# Patient Record
Sex: FEMALE | Race: WHITE | NOT HISPANIC OR LATINO | Employment: FULL TIME | URBAN - METROPOLITAN AREA
[De-identification: names, ages, dates, MRNs, and addresses within clinical notes are randomized per-mention and may not be internally consistent; named-entity substitution may affect disease eponyms.]

---
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
## 2023-07-05 ENCOUNTER — OFFICE VISIT (OUTPATIENT)
Dept: URGENT CARE | Facility: CLINIC | Age: 18
End: 2023-07-05
Payer: COMMERCIAL

## 2023-07-05 VITALS
TEMPERATURE: 99 F | OXYGEN SATURATION: 95 % | HEIGHT: 67 IN | DIASTOLIC BLOOD PRESSURE: 68 MMHG | SYSTOLIC BLOOD PRESSURE: 112 MMHG | HEART RATE: 84 BPM | BODY MASS INDEX: 34.53 KG/M2 | WEIGHT: 220 LBS | RESPIRATION RATE: 20 BRPM

## 2023-07-05 DIAGNOSIS — N89.8 VAGINAL ITCHING: ICD-10-CM

## 2023-07-05 DIAGNOSIS — N89.8 VAGINAL DISCHARGE: ICD-10-CM

## 2023-07-05 DIAGNOSIS — R31.9 HEMATURIA, UNSPECIFIED TYPE: ICD-10-CM

## 2023-07-05 DIAGNOSIS — N89.8 VAGINAL ODOR: ICD-10-CM

## 2023-07-05 DIAGNOSIS — R39.15 URINARY URGENCY: ICD-10-CM

## 2023-07-05 DIAGNOSIS — R30.0 DYSURIA: Primary | ICD-10-CM

## 2023-07-05 DIAGNOSIS — R35.0 URINARY FREQUENCY: ICD-10-CM

## 2023-07-05 DIAGNOSIS — Z11.3 SCREENING FOR STDS (SEXUALLY TRANSMITTED DISEASES): ICD-10-CM

## 2023-07-05 LAB
B-HCG UR QL: NEGATIVE
BILIRUB UR QL STRIP: NEGATIVE
CTP QC/QA: YES
GLUCOSE UR QL STRIP: NEGATIVE
HCV AB SERPL QL IA: NORMAL
HIV 1+2 AB+HIV1 P24 AG SERPL QL IA: NORMAL
KETONES UR QL STRIP: NEGATIVE
LEUKOCYTE ESTERASE UR QL STRIP: NEGATIVE
PH, POC UA: 5 (ref 5–8)
POC BLOOD, URINE: POSITIVE
POC NITRATES, URINE: NEGATIVE
PROT UR QL STRIP: NEGATIVE
SP GR UR STRIP: 1.02 (ref 1–1.03)
UROBILINOGEN UR STRIP-ACNC: ABNORMAL (ref 0.1–1.1)

## 2023-07-05 PROCEDURE — 86592 SYPHILIS TEST NON-TREP QUAL: CPT | Performed by: NURSE PRACTITIONER

## 2023-07-05 PROCEDURE — 81003 URINALYSIS AUTO W/O SCOPE: CPT | Mod: QW,S$GLB,, | Performed by: NURSE PRACTITIONER

## 2023-07-05 PROCEDURE — 86803 HEPATITIS C AB TEST: CPT | Performed by: NURSE PRACTITIONER

## 2023-07-05 PROCEDURE — 87591 N.GONORRHOEAE DNA AMP PROB: CPT | Performed by: NURSE PRACTITIONER

## 2023-07-05 PROCEDURE — 81025 URINE PREGNANCY TEST: CPT | Mod: S$GLB,,, | Performed by: NURSE PRACTITIONER

## 2023-07-05 PROCEDURE — 99203 PR OFFICE/OUTPT VISIT, NEW, LEVL III, 30-44 MIN: ICD-10-PCS | Mod: S$GLB,,, | Performed by: NURSE PRACTITIONER

## 2023-07-05 PROCEDURE — 81003 POCT URINALYSIS, DIPSTICK, AUTOMATED, W/O SCOPE: ICD-10-PCS | Mod: QW,S$GLB,, | Performed by: NURSE PRACTITIONER

## 2023-07-05 PROCEDURE — 99203 OFFICE O/P NEW LOW 30 MIN: CPT | Mod: S$GLB,,, | Performed by: NURSE PRACTITIONER

## 2023-07-05 PROCEDURE — 87389 HIV-1 AG W/HIV-1&-2 AB AG IA: CPT | Performed by: NURSE PRACTITIONER

## 2023-07-05 PROCEDURE — 81025 POCT URINE PREGNANCY: ICD-10-PCS | Mod: S$GLB,,, | Performed by: NURSE PRACTITIONER

## 2023-07-05 PROCEDURE — 81514 NFCT DS BV&VAGINITIS DNA ALG: CPT | Performed by: NURSE PRACTITIONER

## 2023-07-05 RX ORDER — NITROFURANTOIN 25; 75 MG/1; MG/1
100 CAPSULE ORAL 2 TIMES DAILY
Qty: 10 CAPSULE | Refills: 0 | Status: SHIPPED | OUTPATIENT
Start: 2023-07-05 | End: 2023-07-10

## 2023-07-05 RX ORDER — FLUCONAZOLE 150 MG/1
TABLET ORAL
Qty: 2 TABLET | Refills: 0 | Status: SHIPPED | OUTPATIENT
Start: 2023-07-05

## 2023-07-05 RX ORDER — FLUCONAZOLE 150 MG/1
150 TABLET ORAL DAILY
Qty: 2 TABLET | Refills: 0 | Status: SHIPPED | OUTPATIENT
Start: 2023-07-05 | End: 2023-07-05

## 2023-07-05 NOTE — PROGRESS NOTES
"Subjective:      Patient ID: Zoie Montes is a 18 y.o. female.    Vitals:  height is 5' 7" (1.702 m) and weight is 99.8 kg (220 lb). Her oral temperature is 98.9 °F (37.2 °C). Her blood pressure is 112/68 and her pulse is 84. Her respiration is 20 and oxygen saturation is 95%.     Chief Complaint: Urinary Tract Infection (Yeast infection, STI screening - Entered by patient)    Patient present with pain and burning while urinating withholding urine and vaginal itching, with discharge with odor to her urine.     18 year old female presents to clinic with complaints of vaginal itching with thick vaginal discharge with a history of frequent yeast infections, vaginal odor, urinary frequency, urinary urgency. Positive for sexual activity without condom use or partner complaint.     Urinary Tract Infection   This is a new problem. The current episode started in the past 7 days. The problem occurs every urination. The problem has been gradually worsening. The quality of the pain is described as aching and burning. The pain is at a severity of 4/10. The pain is mild. She is Sexually active. Associated symptoms include a discharge, frequency, hesitancy, a possible pregnancy, urgency and withholding. Pertinent negatives include no behavior changes, chills, flank pain, hematuria, nausea, sweats, vomiting, weight loss, bubble bath use, constipation or rash. She has tried nothing for the symptoms. The treatment provided no relief.     Constitution: Negative for chills and fever.   Gastrointestinal:  Positive for abdominal pain. Negative for nausea, vomiting and constipation.   Genitourinary:  Positive for dysuria, frequency, urgency, vaginal discharge and vaginal odor. Negative for flank pain, hematuria, vaginal pain, vaginal bleeding, painful ejaculation, genital sore, painful ejaculation and pelvic pain.   Skin:  Negative for rash.    Objective:     Physical Exam   Constitutional: She is oriented to person, place, and " time. She appears well-developed.   HENT:   Head: Normocephalic and atraumatic.   Ears:   Right Ear: External ear normal.   Left Ear: External ear normal.   Nose: Nose normal. No nasal deformity. No epistaxis.   Mouth/Throat: Oropharynx is clear and moist and mucous membranes are normal.   Eyes: Lids are normal.   Neck: Phonation normal. Neck supple.   Cardiovascular: Normal rate.   Pulmonary/Chest: Effort normal.   Abdominal: Normal appearance and bowel sounds are normal. She exhibits no distension. Soft. There is abdominal tenderness in the suprapubic area. There is no left CVA tenderness and no right CVA tenderness.   Neurological: She is alert and oriented to person, place, and time.   Skin: Skin is warm, dry and intact.   Psychiatric: Her speech is normal and behavior is normal.   Nursing note and vitals reviewed.    Assessment:     1. Dysuria    2. Vaginal itching    3. Vaginal odor    4. Vaginal discharge    5. Hematuria, unspecified type    6. Urinary urgency    7. Urinary frequency    8. Screening for STDs (sexually transmitted diseases)      Results for orders placed or performed in visit on 07/05/23   POCT Urinalysis, Dipstick, Automated, W/O Scope   Result Value Ref Range    POC Blood, Urine Positive (A) Negative    POC Bilirubin, Urine Negative Negative    POC Urobilinogen, Urine norm 0.1 - 1.1    POC Ketones, Urine Negative Negative    POC Protein, Urine Negative Negative    POC Nitrates, Urine Negative Negative    POC Glucose, Urine Negative Negative    pH, UA 5.0 (A) 5 - 8    POC Specific Gravity, Urine 1.025 1.003 - 1.029    POC Leukocytes, Urine Negative Negative      Plan:       Dysuria  -     POCT Urinalysis, Dipstick, Automated, W/O Scope  -     POCT urine pregnancy  -     nitrofurantoin, macrocrystal-monohydrate, (MACROBID) 100 MG capsule; Take 1 capsule (100 mg total) by mouth 2 (two) times daily. for 5 days  Dispense: 10 capsule; Refill: 0    Vaginal itching    Vaginal odor  -     VAGINOSIS  SCREEN BY DNA PROBE  -     fluconazole (DIFLUCAN) 150 MG Tab; Take 1 tablet (150 mg total) by mouth once daily. May repeat dose x 1 three days after first dose for 2 days  Dispense: 2 tablet; Refill: 0    Vaginal discharge  -     C. trachomatis/N. gonorrhoeae by AMP DNA [facility]; Vagina  -     VAGINOSIS SCREEN BY DNA PROBE    Hematuria, unspecified type    Urinary urgency  -     nitrofurantoin, macrocrystal-monohydrate, (MACROBID) 100 MG capsule; Take 1 capsule (100 mg total) by mouth 2 (two) times daily. for 5 days  Dispense: 10 capsule; Refill: 0    Urinary frequency  -     nitrofurantoin, macrocrystal-monohydrate, (MACROBID) 100 MG capsule; Take 1 capsule (100 mg total) by mouth 2 (two) times daily. for 5 days  Dispense: 10 capsule; Refill: 0    Screening for STDs (sexually transmitted diseases)  -     HIV 1/2 Ag/Ab (4th Gen)  -     HEPATITIS C ANTIBODY  -     RPR  -     C. trachomatis/N. gonorrhoeae by AMP DNA [facility]; Vagina        Patient Instructions   STD testing  You were tested for STDs on today:    As far the STD testing, we may hold off on any medications till the labs result. We will phone in medication for you if needed.  If we have decided to treat you now be sure to take all the meds as directed.  If you need more meds when the labs result we will call you and phone them in for you.  If you do test positive for STDs you should be retested in about 6 weeks again in 6 monts to ensure true negative results.    Increase condom use to prevent further occurance.  Notify sexual partners of the need for testing.  Complete ALL medication prescribed.  NO sexual intercourse for 7 days after treatment.      Today's testing will give no crediable information if you have unprotected sexual activities going forward.      Urinalysis reviewed with patient, questions encouraged and answered    If you were prescribed antibiotics, please take them to completion.    Patient does not report antibiotic induced yeast  infection.    Please drink plenty of fluids.    Please get plenty of rest.    Please follow up with your primary care doctor or specialist as needed.    Please return here or go to the Emergency Department for any concerns or worsening of condition.

## 2023-07-05 NOTE — PATIENT INSTRUCTIONS
STD testing  You were tested for STDs on today:    As far the STD testing, we may hold off on any medications till the labs result. We will phone in medication for you if needed.  If we have decided to treat you now be sure to take all the meds as directed.  If you need more meds when the labs result we will call you and phone them in for you.  If you do test positive for STDs you should be retested in about 6 weeks again in 6 monts to ensure true negative results.    Increase condom use to prevent further occurance.  Notify sexual partners of the need for testing.  Complete ALL medication prescribed.  NO sexual intercourse for 7 days after treatment.      Today's testing will give no crediable information if you have unprotected sexual activities going forward.      Urinalysis reviewed with patient, questions encouraged and answered    If you were prescribed antibiotics, please take them to completion.    Patient does not report antibiotic induced yeast infection.    Please drink plenty of fluids.    Please get plenty of rest.    Please follow up with your primary care doctor or specialist as needed.    Please return here or go to the Emergency Department for any concerns or worsening of condition.

## 2023-07-06 ENCOUNTER — TELEPHONE (OUTPATIENT)
Dept: URGENT CARE | Facility: CLINIC | Age: 18
End: 2023-07-06
Payer: COMMERCIAL

## 2023-07-06 DIAGNOSIS — B00.9 HERPES SIMPLEX TYPE 2 INFECTION: ICD-10-CM

## 2023-07-06 DIAGNOSIS — B96.89 BACTERIAL VAGINOSIS: Primary | ICD-10-CM

## 2023-07-06 DIAGNOSIS — N76.0 BACTERIAL VAGINOSIS: Primary | ICD-10-CM

## 2023-07-06 LAB
BACTERIAL VAGINOSIS DNA: POSITIVE
C TRACH DNA SPEC QL NAA+PROBE: NOT DETECTED
CANDIDA GLABRATA DNA: NEGATIVE
CANDIDA KRUSEI DNA: NEGATIVE
CANDIDA RRNA VAG QL PROBE: POSITIVE
N GONORRHOEA DNA SPEC QL NAA+PROBE: NOT DETECTED
RPR SER QL: NORMAL
T VAGINALIS RRNA GENITAL QL PROBE: NEGATIVE

## 2023-07-06 RX ORDER — VALACYCLOVIR HYDROCHLORIDE 500 MG/1
1000 TABLET, FILM COATED ORAL DAILY
Qty: 60 TABLET | Refills: 0 | Status: SHIPPED | OUTPATIENT
Start: 2023-07-06 | End: 2023-08-05

## 2023-07-06 RX ORDER — METRONIDAZOLE 500 MG/1
500 TABLET ORAL EVERY 12 HOURS
Qty: 14 TABLET | Refills: 0 | Status: SHIPPED | OUTPATIENT
Start: 2023-07-06 | End: 2023-07-13

## 2023-07-06 NOTE — TELEPHONE ENCOUNTER
Results for orders placed or performed in visit on 07/05/23   C. trachomatis/N. gonorrhoeae by AMP DNA [facility]; Vagina    Specimen: Genital   Result Value Ref Range    Chlamydia, Amplified DNA Not Detected Not Detected    N gonorrhoeae, amplified DNA Not Detected Not Detected   VAGINOSIS SCREEN BY DNA PROBE    Specimen: Vagina; Genital   Result Value Ref Range    Trichomonas vaginalis Negative Negative    Candida sp Positive (A) Negative    Candida glabrata DNA Negative Negative    Candida krusei DNA Negative Negative    Bacterial vaginosis DNA Positive (A) Negative   HIV 1/2 Ag/Ab (4th Gen)   Result Value Ref Range    HIV 1/2 Ag/Ab Non-reactive Non-reactive   HEPATITIS C ANTIBODY   Result Value Ref Range    Hepatitis C Ab Non-reactive Non-reactive   RPR   Result Value Ref Range    RPR Non-reactive Non-reactive   POCT Urinalysis, Dipstick, Automated, W/O Scope   Result Value Ref Range    POC Blood, Urine Positive (A) Negative    POC Bilirubin, Urine Negative Negative    POC Urobilinogen, Urine norm 0.1 - 1.1    POC Ketones, Urine Negative Negative    POC Protein, Urine Negative Negative    POC Nitrates, Urine Negative Negative    POC Glucose, Urine Negative Negative    pH, UA 5.0 (A) 5 - 8    POC Specific Gravity, Urine 1.025 1.003 - 1.029    POC Leukocytes, Urine Negative Negative   POCT urine pregnancy   Result Value Ref Range    POC Preg Test, Ur Negative Negative     Acceptable Yes         Discussed lab results with the patient.  Positive for Candida and bacterial vaginosis.  All other labs, unremarkable.  Patient recently treated for Candida with Diflucan, she reports symptoms have greatly improved.  Discussed treatment for bacterial vaginosis with Flagyl twice a day for 7 days, patient verbalized understanding.  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  Advised on safe sexual practices, condom use; patient verbalizes understanding.  Advised to follow-up with her  PCP, our gyn if symptoms should return and or return to urgent care for further evaluation.

## 2023-08-24 ENCOUNTER — OFFICE VISIT (OUTPATIENT)
Dept: URGENT CARE | Facility: CLINIC | Age: 18
End: 2023-08-24
Payer: COMMERCIAL

## 2023-08-24 VITALS
SYSTOLIC BLOOD PRESSURE: 104 MMHG | BODY MASS INDEX: 34.53 KG/M2 | HEART RATE: 87 BPM | DIASTOLIC BLOOD PRESSURE: 77 MMHG | HEIGHT: 67 IN | RESPIRATION RATE: 18 BRPM | TEMPERATURE: 98 F | OXYGEN SATURATION: 97 % | WEIGHT: 220 LBS

## 2023-08-24 DIAGNOSIS — R53.83 FATIGUE, UNSPECIFIED TYPE: ICD-10-CM

## 2023-08-24 DIAGNOSIS — J40 BRONCHITIS: ICD-10-CM

## 2023-08-24 DIAGNOSIS — R05.9 COUGH, UNSPECIFIED TYPE: ICD-10-CM

## 2023-08-24 DIAGNOSIS — B34.9 VIRAL SYNDROME: Primary | ICD-10-CM

## 2023-08-24 DIAGNOSIS — R06.2 WHEEZING: ICD-10-CM

## 2023-08-24 DIAGNOSIS — R51.9 ACUTE NONINTRACTABLE HEADACHE, UNSPECIFIED HEADACHE TYPE: ICD-10-CM

## 2023-08-24 DIAGNOSIS — Z72.0 CURRENT EVERY DAY NICOTINE VAPING: ICD-10-CM

## 2023-08-24 DIAGNOSIS — J02.9 SORE THROAT: ICD-10-CM

## 2023-08-24 DIAGNOSIS — R09.81 NASAL CONGESTION: ICD-10-CM

## 2023-08-24 LAB
CTP QC/QA: YES
SARS-COV-2 AG RESP QL IA.RAPID: NEGATIVE

## 2023-08-24 PROCEDURE — 96372 THER/PROPH/DIAG INJ SC/IM: CPT | Mod: S$GLB,,, | Performed by: NURSE PRACTITIONER

## 2023-08-24 PROCEDURE — 87811 SARS-COV-2 COVID19 W/OPTIC: CPT | Mod: QW,S$GLB,, | Performed by: NURSE PRACTITIONER

## 2023-08-24 PROCEDURE — 96372 PR INJECTION,THERAP/PROPH/DIAG2ST, IM OR SUBCUT: ICD-10-PCS | Mod: S$GLB,,, | Performed by: NURSE PRACTITIONER

## 2023-08-24 PROCEDURE — 99213 PR OFFICE/OUTPT VISIT, EST, LEVL III, 20-29 MIN: ICD-10-PCS | Mod: 25,S$GLB,, | Performed by: NURSE PRACTITIONER

## 2023-08-24 PROCEDURE — 87811 SARS CORONAVIRUS 2 ANTIGEN POCT, MANUAL READ: ICD-10-PCS | Mod: QW,S$GLB,, | Performed by: NURSE PRACTITIONER

## 2023-08-24 PROCEDURE — 99213 OFFICE O/P EST LOW 20 MIN: CPT | Mod: 25,S$GLB,, | Performed by: NURSE PRACTITIONER

## 2023-08-24 RX ORDER — FLUTICASONE PROPIONATE 50 MCG
2 SPRAY, SUSPENSION (ML) NASAL DAILY PRN
Qty: 15.8 ML | Refills: 0 | Status: SHIPPED | OUTPATIENT
Start: 2023-08-24

## 2023-08-24 RX ORDER — ALBUTEROL SULFATE 90 UG/1
2 AEROSOL, METERED RESPIRATORY (INHALATION) EVERY 6 HOURS PRN
Qty: 18 G | Refills: 0 | Status: SHIPPED | OUTPATIENT
Start: 2023-08-24 | End: 2024-08-23

## 2023-08-24 RX ORDER — BENZONATATE 100 MG/1
100 CAPSULE ORAL 3 TIMES DAILY PRN
Qty: 30 CAPSULE | Refills: 0 | Status: SHIPPED | OUTPATIENT
Start: 2023-08-24 | End: 2023-09-03

## 2023-08-24 RX ORDER — DEXAMETHASONE SODIUM PHOSPHATE 100 MG/10ML
10 INJECTION INTRAMUSCULAR; INTRAVENOUS
Status: COMPLETED | OUTPATIENT
Start: 2023-08-24 | End: 2023-08-24

## 2023-08-24 RX ADMIN — DEXAMETHASONE SODIUM PHOSPHATE 10 MG: 100 INJECTION INTRAMUSCULAR; INTRAVENOUS at 05:08

## 2023-08-24 NOTE — LETTER
August 24, 2023      Urgent Care - [address] [address]  Phone: [phone]  Fax: [phone]       Patient: Zoie Montes   YOB: 2005  Date of Visit: 08/24/2023    To Whom It May Concern:    True Montes  was at [facility] on 08/24/2023. The patient may return to work/school on 08/26/2023 with no restrictions. If you have any questions or concerns, or if I can be of further assistance, please do not hesitate to contact me.    Sincerely,     Cuca Ortiz NP

## 2023-08-24 NOTE — PROGRESS NOTES
"Subjective:      Patient ID: Zoie Montes is a 18 y.o. female.    Vitals:  height is 5' 7" (1.702 m) and weight is 99.8 kg (220 lb). Her oral temperature is 98 °F (36.7 °C). Her blood pressure is 104/77 and her pulse is 87. Her respiration is 18 and oxygen saturation is 97%.     Chief Complaint: Nasal Congestion (Need to be cleared to return to school - Entered by patient) and Cough    Patient presents with cough, congestion,sore throat,headache,fatigue.no fever 4 days on set. Otc dayquil night quil. Home covid test negative    18 year old female presents to clinic with complaints of nasal congestion, sore throat, headache, fatigue x 4 days treating with dayquil, Nyquil. Home covid test with negative results. History of Pfizer covid vaccine x 1 dose 1/17/22     Cough  This is a new problem. Episode onset: -4. The problem has been gradually improving. Associated symptoms include headaches, nasal congestion, postnasal drip, a sore throat and wheezing. Pertinent negatives include no chills or fever. She has tried OTC cough suppressant for the symptoms. The treatment provided moderate relief.       Constitution: Positive for fatigue. Negative for chills, sweating and fever.   HENT:  Positive for congestion, postnasal drip and sore throat.    Respiratory:  Positive for cough and wheezing.    Neurological:  Positive for headaches.      Objective:     Physical Exam   Constitutional: She is oriented to person, place, and time. She appears well-developed. She is cooperative.  Non-toxic appearance. She does not appear ill. No distress.   HENT:   Head: Normocephalic and atraumatic.   Ears:   Right Ear: Hearing, tympanic membrane, external ear and ear canal normal.   Left Ear: Hearing, tympanic membrane, external ear and ear canal normal.   Nose: Mucosal edema and rhinorrhea present. No nasal deformity. No epistaxis. Right sinus exhibits no maxillary sinus tenderness and no frontal sinus tenderness. Left sinus exhibits no " maxillary sinus tenderness and no frontal sinus tenderness.   Mouth/Throat: Uvula is midline, oropharynx is clear and moist and mucous membranes are normal. No trismus in the jaw. Normal dentition. No uvula swelling. No oropharyngeal exudate, posterior oropharyngeal edema or posterior oropharyngeal erythema.   Eyes: Conjunctivae and lids are normal. No scleral icterus.   Neck: Trachea normal and phonation normal. Neck supple. No edema present. No erythema present. No neck rigidity present.   Cardiovascular: Normal rate, regular rhythm and normal heart sounds.   Pulmonary/Chest: Effort normal. No respiratory distress. She has no decreased breath sounds. She has wheezes. She has no rhonchi.   Abdominal: Normal appearance.   Musculoskeletal: Normal range of motion.         General: No deformity. Normal range of motion.   Neurological: She is alert and oriented to person, place, and time. She exhibits normal muscle tone. Coordination normal.   Skin: Skin is warm, dry, intact, not diaphoretic and not pale.   Psychiatric: Her speech is normal and behavior is normal. Judgment and thought content normal.   Nursing note and vitals reviewed.      Assessment:     1. Viral syndrome    2. Cough, unspecified type    3. Nasal congestion    4. Sore throat    5. Acute nonintractable headache, unspecified headache type    6. Fatigue, unspecified type    7. Wheezing    8. Current every day nicotine vaping    9. Bronchitis      Results for orders placed or performed in visit on 08/24/23   SARS Coronavirus 2 Antigen, POCT Manual Read   Result Value Ref Range    SARS Coronavirus 2 Antigen Negative Negative     Acceptable Yes       Plan:       Viral syndrome    Cough, unspecified type  -     SARS Coronavirus 2 Antigen, POCT Manual Read  -     benzonatate (TESSALON) 100 MG capsule; Take 1 capsule (100 mg total) by mouth 3 (three) times daily as needed for Cough.  Dispense: 30 capsule; Refill: 0    Nasal congestion  -      fluticasone propionate (FLONASE) 50 mcg/actuation nasal spray; 2 sprays (100 mcg total) by Each Nostril route daily as needed for Rhinitis (nasal congestion).  Dispense: 15.8 mL; Refill: 0    Sore throat    Acute nonintractable headache, unspecified headache type    Fatigue, unspecified type    Wheezing  -     albuterol (VENTOLIN HFA) 90 mcg/actuation inhaler; Inhale 2 puffs into the lungs every 6 (six) hours as needed for Wheezing. Rescue  Dispense: 18 g; Refill: 0    Current every day nicotine vaping    Bronchitis  -     benzonatate (TESSALON) 100 MG capsule; Take 1 capsule (100 mg total) by mouth 3 (three) times daily as needed for Cough.  Dispense: 30 capsule; Refill: 0  -     albuterol (VENTOLIN HFA) 90 mcg/actuation inhaler; Inhale 2 puffs into the lungs every 6 (six) hours as needed for Wheezing. Rescue  Dispense: 18 g; Refill: 0  -     dexAMETHasone injection 10 mg      Patient Instructions   Please drink plenty of fluids.  Please get plenty of rest.  Please return here or go to the Emergency Department for any concerns or worsening of condition.  If you do not have Hypertension or any history of palpitations, it is ok to take over the counter Sudafed or Mucinex D or Allegra-D or Claritin-D or Zyrtec-D or plain Allegra or Claritin or Zyrtec.   If you do have Hypertension or palpitations, it is safe to take Coricidin HBP for relief of sinus symptoms.  We recommend you take Flonase (Fluticasone) or another nasally inhaled steroid unless you are already taking one.  Nasal irrigation with a saline spray or Netti Pot like device per their directions is also recommended.  If not allergic, please take over the counter Tylenol (Acetaminophen) and/or Motrin (Ibuprofen) as directed for control of pain and/or fever.  Please follow up with your primary care doctor or specialist as needed.    If you  smoke, please stop smoking.

## 2023-10-11 ENCOUNTER — OFFICE VISIT (OUTPATIENT)
Dept: OBSTETRICS AND GYNECOLOGY | Facility: CLINIC | Age: 18
End: 2023-10-11
Payer: COMMERCIAL

## 2023-10-11 VITALS
SYSTOLIC BLOOD PRESSURE: 120 MMHG | BODY MASS INDEX: 34.84 KG/M2 | DIASTOLIC BLOOD PRESSURE: 88 MMHG | HEIGHT: 67 IN | WEIGHT: 222 LBS

## 2023-10-11 DIAGNOSIS — Z00.00 ANNUAL PHYSICAL EXAM: Primary | ICD-10-CM

## 2023-10-11 DIAGNOSIS — Z11.3 SCREEN FOR SEXUALLY TRANSMITTED DISEASES: ICD-10-CM

## 2023-10-11 DIAGNOSIS — R35.0 URINARY FREQUENCY: ICD-10-CM

## 2023-10-11 LAB
B-HCG UR QL: NEGATIVE
CTP QC/QA: YES

## 2023-10-11 PROCEDURE — 99395 PR PREVENTIVE VISIT,EST,18-39: ICD-10-PCS | Mod: S$GLB,,, | Performed by: STUDENT IN AN ORGANIZED HEALTH CARE EDUCATION/TRAINING PROGRAM

## 2023-10-11 PROCEDURE — 87086 URINE CULTURE/COLONY COUNT: CPT | Performed by: STUDENT IN AN ORGANIZED HEALTH CARE EDUCATION/TRAINING PROGRAM

## 2023-10-11 PROCEDURE — 3074F SYST BP LT 130 MM HG: CPT | Mod: CPTII,S$GLB,, | Performed by: STUDENT IN AN ORGANIZED HEALTH CARE EDUCATION/TRAINING PROGRAM

## 2023-10-11 PROCEDURE — 3074F PR MOST RECENT SYSTOLIC BLOOD PRESSURE < 130 MM HG: ICD-10-PCS | Mod: CPTII,S$GLB,, | Performed by: STUDENT IN AN ORGANIZED HEALTH CARE EDUCATION/TRAINING PROGRAM

## 2023-10-11 PROCEDURE — 81514 NFCT DS BV&VAGINITIS DNA ALG: CPT | Performed by: STUDENT IN AN ORGANIZED HEALTH CARE EDUCATION/TRAINING PROGRAM

## 2023-10-11 PROCEDURE — 1159F PR MEDICATION LIST DOCUMENTED IN MEDICAL RECORD: ICD-10-PCS | Mod: CPTII,S$GLB,, | Performed by: STUDENT IN AN ORGANIZED HEALTH CARE EDUCATION/TRAINING PROGRAM

## 2023-10-11 PROCEDURE — 3079F PR MOST RECENT DIASTOLIC BLOOD PRESSURE 80-89 MM HG: ICD-10-PCS | Mod: CPTII,S$GLB,, | Performed by: STUDENT IN AN ORGANIZED HEALTH CARE EDUCATION/TRAINING PROGRAM

## 2023-10-11 PROCEDURE — 81025 URINE PREGNANCY TEST: CPT | Mod: S$GLB,,, | Performed by: STUDENT IN AN ORGANIZED HEALTH CARE EDUCATION/TRAINING PROGRAM

## 2023-10-11 PROCEDURE — 3008F BODY MASS INDEX DOCD: CPT | Mod: CPTII,S$GLB,, | Performed by: STUDENT IN AN ORGANIZED HEALTH CARE EDUCATION/TRAINING PROGRAM

## 2023-10-11 PROCEDURE — 99999 PR PBB SHADOW E&M-EST. PATIENT-LVL III: ICD-10-PCS | Mod: PBBFAC,,, | Performed by: STUDENT IN AN ORGANIZED HEALTH CARE EDUCATION/TRAINING PROGRAM

## 2023-10-11 PROCEDURE — 99999 PR PBB SHADOW E&M-EST. PATIENT-LVL III: CPT | Mod: PBBFAC,,, | Performed by: STUDENT IN AN ORGANIZED HEALTH CARE EDUCATION/TRAINING PROGRAM

## 2023-10-11 PROCEDURE — 87591 N.GONORRHOEAE DNA AMP PROB: CPT | Performed by: STUDENT IN AN ORGANIZED HEALTH CARE EDUCATION/TRAINING PROGRAM

## 2023-10-11 PROCEDURE — 3079F DIAST BP 80-89 MM HG: CPT | Mod: CPTII,S$GLB,, | Performed by: STUDENT IN AN ORGANIZED HEALTH CARE EDUCATION/TRAINING PROGRAM

## 2023-10-11 PROCEDURE — 81025 POCT URINE PREGNANCY: ICD-10-PCS | Mod: S$GLB,,, | Performed by: STUDENT IN AN ORGANIZED HEALTH CARE EDUCATION/TRAINING PROGRAM

## 2023-10-11 PROCEDURE — 3008F PR BODY MASS INDEX (BMI) DOCUMENTED: ICD-10-PCS | Mod: CPTII,S$GLB,, | Performed by: STUDENT IN AN ORGANIZED HEALTH CARE EDUCATION/TRAINING PROGRAM

## 2023-10-11 PROCEDURE — 1159F MED LIST DOCD IN RCRD: CPT | Mod: CPTII,S$GLB,, | Performed by: STUDENT IN AN ORGANIZED HEALTH CARE EDUCATION/TRAINING PROGRAM

## 2023-10-11 PROCEDURE — 99395 PREV VISIT EST AGE 18-39: CPT | Mod: S$GLB,,, | Performed by: STUDENT IN AN ORGANIZED HEALTH CARE EDUCATION/TRAINING PROGRAM

## 2023-10-11 NOTE — PROGRESS NOTES
History & Physical  Gynecology      SUBJECTIVE:     Chief Complaint: No chief complaint on file.       History of Present Illness:  Zoie Montes is a 18 y.o.  who presents for annual physical exam and to establish care.     She reports her periods are regular interval. She feels that they have been getting heavier and more painful over the last year or so.  She is sexually active and uses condoms for contraception. Has tried nexplanon and nuvaring in the past. Hated the bleeding with nexplanon. Had mood changes with nuvaring. Open to trying contraception but not at this time. Would like STI screening today.  No vaginal discharge, odor, or itching. She does have some dyspareunia with deep penetration. States this has been going on for about a year as well.  She was treated for a UTI about 2 months ago. Dysuria resolved but still having frequency. Unsure if fully treated.      Review of patient's allergies indicates:   Allergen Reactions    Monistat 7 [miconazole-skin [id number]] Itching     Itching and burning       Past Medical History:   Diagnosis Date    Herpes simplex virus (HSV) infection      History reviewed. No pertinent surgical history.  OB History          0    Para   0    Term   0       0    AB   0    Living   0         SAB   0    IAB   0    Ectopic   0    Multiple   0    Live Births   0               Family History   Problem Relation Age of Onset    Breast cancer Maternal Aunt     Colon cancer Neg Hx     Ovarian cancer Neg Hx      Social History     Tobacco Use    Smoking status: Every Day     Types: Vaping with nicotine    Smokeless tobacco: Never   Substance Use Topics    Alcohol use: Yes     Comment: socially    Drug use: Yes     Types: Marijuana       Current Outpatient Medications   Medication Sig    albuterol (VENTOLIN HFA) 90 mcg/actuation inhaler Inhale 2 puffs into the lungs every 6 (six) hours as needed for Wheezing. Rescue    fluconazole (DIFLUCAN) 150 MG Tab TAKE 1  TABLET BY MOUTH ONCE DAILY. MAY REPEAT DOSE X 1 THREE DAYS AFTER FIRST DOSE FOR 2 DAYS (Patient not taking: Reported on 8/24/2023)    fluticasone propionate (FLONASE) 50 mcg/actuation nasal spray 2 sprays (100 mcg total) by Each Nostril route daily as needed for Rhinitis (nasal congestion).    valACYclovir (VALTREX) 500 MG tablet Take 2 tablets (1,000 mg total) by mouth once daily.     No current facility-administered medications for this visit.         Review of Systems:  Review of Systems   Constitutional:  Negative for chills and fever.   Respiratory:  Negative for shortness of breath.    Cardiovascular:  Negative for chest pain.   Gastrointestinal:  Negative for abdominal pain, constipation, diarrhea, nausea and vomiting.   Genitourinary:  Positive for dysmenorrhea, dyspareunia, frequency and menorrhagia. Negative for dysuria, menstrual problem, pelvic pain, vaginal bleeding, vaginal discharge and vaginal odor.   Integumentary:  Negative for breast mass, nipple discharge and breast skin changes.   Neurological:  Negative for headaches.   Breast: Negative for mass, nipple discharge and skin changes       OBJECTIVE:     Physical Exam:  Physical Exam  Vitals reviewed. Exam conducted with a chaperone present.   Constitutional:       General: She is not in acute distress.     Appearance: Normal appearance. She is well-developed. She is not ill-appearing or toxic-appearing.   HENT:      Head: Normocephalic and atraumatic.      Nose: Nose normal.   Eyes:      Extraocular Movements: Extraocular movements intact.      Conjunctiva/sclera: Conjunctivae normal.   Cardiovascular:      Rate and Rhythm: Normal rate.   Pulmonary:      Effort: Pulmonary effort is normal. No respiratory distress.   Chest:   Breasts:     Right: Normal. No swelling, bleeding, inverted nipple, mass, nipple discharge, skin change or tenderness.      Left: Normal. No swelling, bleeding, inverted nipple, mass, nipple discharge, skin change or  tenderness.   Abdominal:      Palpations: Abdomen is soft. There is no mass.      Tenderness: There is no abdominal tenderness. There is no guarding or rebound.   Genitourinary:     Vagina: Normal. No vaginal discharge or bleeding.      Cervix: Normal.      Uterus: Normal. Not enlarged and not tender.       Adnexa:         Right: No mass, tenderness or fullness.          Left: No mass, tenderness or fullness.        Comments: External genitalia: Normal  Urethral: Nontender, no masses  Urethral meatus: Normal  Bladder: Non-tender  Musculoskeletal:         General: Normal range of motion.      Cervical back: Normal range of motion.   Skin:     General: Skin is warm and dry.   Neurological:      Mental Status: She is alert. Mental status is at baseline.   Psychiatric:         Mood and Affect: Mood normal.         Behavior: Behavior normal.         Thought Content: Thought content normal.         ASSESSMENT:       ICD-10-CM ICD-9-CM    1. Annual physical exam  Z00.00 V70.0 POCT Urine Pregnancy      2. Urinary frequency  R35.0 788.41 CULTURE, URINE      3. Screen for sexually transmitted diseases  Z11.3 V74.5 C. trachomatis/N. gonorrhoeae by AMP DNA      VAGINOSIS SCREEN BY DNA PROBE             Plan:      -- UPT negative.  -- Pap at 21.  -- GC/CT and affirm collected. Urine culture sent.  -- Normal bimanual exam. Some discomfort on manipulation of cervix. Reassurance provided. Should dyspareunia worsen, can consider US.  -- She does not want to start contraception at this time, will let us know if she has questions or wants to start something.      Yudith Gross MD

## 2023-10-12 LAB
BACTERIA UR CULT: NO GROWTH
BACTERIAL VAGINOSIS DNA: NEGATIVE
C TRACH DNA SPEC QL NAA+PROBE: NOT DETECTED
CANDIDA GLABRATA DNA: NEGATIVE
CANDIDA KRUSEI DNA: NEGATIVE
CANDIDA RRNA VAG QL PROBE: NEGATIVE
N GONORRHOEA DNA SPEC QL NAA+PROBE: NOT DETECTED
T VAGINALIS RRNA GENITAL QL PROBE: NEGATIVE

## 2024-03-29 ENCOUNTER — HOSPITAL ENCOUNTER (EMERGENCY)
Facility: OTHER | Age: 19
Discharge: HOME | End: 2024-03-29
Attending: INTERNAL MEDICINE
Payer: COMMERCIAL

## 2024-03-29 VITALS
HEART RATE: 89 BPM | OXYGEN SATURATION: 99 % | BODY MASS INDEX: 36.1 KG/M2 | TEMPERATURE: 99 F | SYSTOLIC BLOOD PRESSURE: 136 MMHG | RESPIRATION RATE: 19 BRPM | DIASTOLIC BLOOD PRESSURE: 81 MMHG | HEIGHT: 67 IN | WEIGHT: 230 LBS

## 2024-03-29 DIAGNOSIS — Z91.89 AT RISK FOR PROLONGED QT INTERVAL SYNDROME: ICD-10-CM

## 2024-03-29 DIAGNOSIS — R10.9 ABDOMINAL PAIN, UNSPECIFIED ABDOMINAL LOCATION: Primary | ICD-10-CM

## 2024-03-29 DIAGNOSIS — R11.2 NAUSEA AND VOMITING, UNSPECIFIED VOMITING TYPE: ICD-10-CM

## 2024-03-29 LAB
ALBUMIN SERPL BCP-MCNC: 4.1 G/DL (ref 3.5–5.2)
ALP SERPL-CCNC: 70 U/L (ref 55–135)
ALT SERPL W/O P-5'-P-CCNC: 22 U/L (ref 10–44)
ANION GAP SERPL CALC-SCNC: 10 MMOL/L (ref 8–16)
AST SERPL-CCNC: 21 U/L (ref 10–40)
B-HCG UR QL: NEGATIVE
BACTERIA #/AREA URNS HPF: ABNORMAL /HPF
BASOPHILS # BLD AUTO: 0.02 K/UL (ref 0–0.2)
BASOPHILS NFR BLD: 0.2 % (ref 0–1.9)
BILIRUB SERPL-MCNC: 1.6 MG/DL (ref 0.1–1)
BILIRUB UR QL STRIP: ABNORMAL
BUN SERPL-MCNC: 19 MG/DL (ref 6–20)
CALCIUM SERPL-MCNC: 9.2 MG/DL (ref 8.7–10.5)
CHLORIDE SERPL-SCNC: 107 MMOL/L (ref 95–110)
CLARITY UR: ABNORMAL
CO2 SERPL-SCNC: 21 MMOL/L (ref 23–29)
COLOR UR: YELLOW
CREAT SERPL-MCNC: 0.8 MG/DL (ref 0.5–1.4)
CTP QC/QA: YES
DIFFERENTIAL METHOD BLD: ABNORMAL
EOSINOPHIL # BLD AUTO: 0.1 K/UL (ref 0–0.5)
EOSINOPHIL NFR BLD: 0.5 % (ref 0–8)
ERYTHROCYTE [DISTWIDTH] IN BLOOD BY AUTOMATED COUNT: 12.9 % (ref 11.5–14.5)
EST. GFR  (NO RACE VARIABLE): >60 ML/MIN/1.73 M^2
GLUCOSE SERPL-MCNC: 134 MG/DL (ref 70–110)
GLUCOSE UR QL STRIP: NEGATIVE
HCT VFR BLD AUTO: 45.2 % (ref 37–48.5)
HGB BLD-MCNC: 15.6 G/DL (ref 12–16)
HGB UR QL STRIP: ABNORMAL
HYALINE CASTS #/AREA URNS LPF: 0 /LPF
IMM GRANULOCYTES # BLD AUTO: 0.04 K/UL (ref 0–0.04)
IMM GRANULOCYTES NFR BLD AUTO: 0.3 % (ref 0–0.5)
KETONES UR QL STRIP: ABNORMAL
LEUKOCYTE ESTERASE UR QL STRIP: NEGATIVE
LIPASE SERPL-CCNC: 16 U/L (ref 4–60)
LYMPHOCYTES # BLD AUTO: 0.5 K/UL (ref 1–4.8)
LYMPHOCYTES NFR BLD: 4.7 % (ref 18–48)
MCH RBC QN AUTO: 27.7 PG (ref 27–31)
MCHC RBC AUTO-ENTMCNC: 34.5 G/DL (ref 32–36)
MCV RBC AUTO: 80 FL (ref 82–98)
MICROSCOPIC COMMENT: ABNORMAL
MONOCYTES # BLD AUTO: 1 K/UL (ref 0.3–1)
MONOCYTES NFR BLD: 8.7 % (ref 4–15)
NEUTROPHILS # BLD AUTO: 9.8 K/UL (ref 1.8–7.7)
NEUTROPHILS NFR BLD: 85.6 % (ref 38–73)
NITRITE UR QL STRIP: NEGATIVE
NRBC BLD-RTO: 0 /100 WBC
PH UR STRIP: 6 [PH] (ref 5–8)
PLATELET # BLD AUTO: 232 K/UL (ref 150–450)
PMV BLD AUTO: 10.1 FL (ref 9.2–12.9)
POTASSIUM SERPL-SCNC: 3.8 MMOL/L (ref 3.5–5.1)
PROT SERPL-MCNC: 7.2 G/DL (ref 6–8.4)
PROT UR QL STRIP: ABNORMAL
RBC # BLD AUTO: 5.64 M/UL (ref 4–5.4)
RBC #/AREA URNS HPF: 8 /HPF (ref 0–4)
SODIUM SERPL-SCNC: 138 MMOL/L (ref 136–145)
SP GR UR STRIP: >=1.03 (ref 1–1.03)
SQUAMOUS #/AREA URNS HPF: 3 /HPF
URN SPEC COLLECT METH UR: ABNORMAL
UROBILINOGEN UR STRIP-ACNC: NEGATIVE EU/DL
WBC # BLD AUTO: 11.44 K/UL (ref 3.9–12.7)
WBC #/AREA URNS HPF: 1 /HPF (ref 0–5)

## 2024-03-29 PROCEDURE — 25000003 PHARM REV CODE 250: Performed by: EMERGENCY MEDICINE

## 2024-03-29 PROCEDURE — 25000003 PHARM REV CODE 250: Performed by: INTERNAL MEDICINE

## 2024-03-29 PROCEDURE — 85025 COMPLETE CBC W/AUTO DIFF WBC: CPT | Performed by: EMERGENCY MEDICINE

## 2024-03-29 PROCEDURE — 81025 URINE PREGNANCY TEST: CPT | Performed by: INTERNAL MEDICINE

## 2024-03-29 PROCEDURE — 25500020 PHARM REV CODE 255: Performed by: INTERNAL MEDICINE

## 2024-03-29 PROCEDURE — 83690 ASSAY OF LIPASE: CPT | Performed by: EMERGENCY MEDICINE

## 2024-03-29 PROCEDURE — 96374 THER/PROPH/DIAG INJ IV PUSH: CPT | Mod: 59

## 2024-03-29 PROCEDURE — 99285 EMERGENCY DEPT VISIT HI MDM: CPT | Mod: 25

## 2024-03-29 PROCEDURE — 93005 ELECTROCARDIOGRAM TRACING: CPT

## 2024-03-29 PROCEDURE — 96361 HYDRATE IV INFUSION ADD-ON: CPT

## 2024-03-29 PROCEDURE — 93010 ELECTROCARDIOGRAM REPORT: CPT | Mod: ,,, | Performed by: INTERNAL MEDICINE

## 2024-03-29 PROCEDURE — 63600175 PHARM REV CODE 636 W HCPCS: Performed by: INTERNAL MEDICINE

## 2024-03-29 PROCEDURE — 80053 COMPREHEN METABOLIC PANEL: CPT | Performed by: EMERGENCY MEDICINE

## 2024-03-29 PROCEDURE — 81000 URINALYSIS NONAUTO W/SCOPE: CPT | Performed by: EMERGENCY MEDICINE

## 2024-03-29 PROCEDURE — 96375 TX/PRO/DX INJ NEW DRUG ADDON: CPT

## 2024-03-29 RX ORDER — MORPHINE SULFATE 2 MG/ML
2 INJECTION, SOLUTION INTRAMUSCULAR; INTRAVENOUS
Status: COMPLETED | OUTPATIENT
Start: 2024-03-29 | End: 2024-03-29

## 2024-03-29 RX ORDER — ONDANSETRON 4 MG/1
4 TABLET, ORALLY DISINTEGRATING ORAL
Status: COMPLETED | OUTPATIENT
Start: 2024-03-29 | End: 2024-03-29

## 2024-03-29 RX ORDER — ONDANSETRON 4 MG/1
4 TABLET, FILM COATED ORAL EVERY 6 HOURS PRN
Qty: 12 TABLET | Refills: 0 | Status: SHIPPED | OUTPATIENT
Start: 2024-03-29

## 2024-03-29 RX ORDER — ONDANSETRON HYDROCHLORIDE 2 MG/ML
4 INJECTION, SOLUTION INTRAVENOUS
Status: COMPLETED | OUTPATIENT
Start: 2024-03-29 | End: 2024-03-29

## 2024-03-29 RX ORDER — DROPERIDOL 2.5 MG/ML
1.25 INJECTION, SOLUTION INTRAMUSCULAR; INTRAVENOUS
Status: COMPLETED | OUTPATIENT
Start: 2024-03-29 | End: 2024-03-29

## 2024-03-29 RX ADMIN — IOHEXOL 100 ML: 350 INJECTION, SOLUTION INTRAVENOUS at 03:03

## 2024-03-29 RX ADMIN — DROPERIDOL 1.25 MG: 2.5 INJECTION, SOLUTION INTRAMUSCULAR; INTRAVENOUS at 03:03

## 2024-03-29 RX ADMIN — ONDANSETRON 4 MG: 2 INJECTION INTRAMUSCULAR; INTRAVENOUS at 02:03

## 2024-03-29 RX ADMIN — MORPHINE SULFATE 2 MG: 2 INJECTION, SOLUTION INTRAMUSCULAR; INTRAVENOUS at 03:03

## 2024-03-29 RX ADMIN — SODIUM CHLORIDE 1000 ML: 0.9 INJECTION, SOLUTION INTRAVENOUS at 01:03

## 2024-03-29 RX ADMIN — SODIUM CHLORIDE 1000 ML: 0.9 INJECTION, SOLUTION INTRAVENOUS at 03:03

## 2024-03-29 RX ADMIN — ONDANSETRON 4 MG: 4 TABLET, ORALLY DISINTEGRATING ORAL at 01:03

## 2024-03-29 NOTE — ED PROVIDER NOTES
Encounter date: 2:31 AM 03/29/2024    Source of History   Patient/Friend's mother at beside    Chief Complaint   Pt presents with:   Abdominal Pain (Pt states she has ABD pain that started around 6:00pm, pt states that she has had N,V,&D that also stared around the same time)      History Of Present Illness   Zoie Montes is a 19 y.o. female with no significant history who presents to the ED with chief complaint of nausea and vomiting onset 12 PM yesterday. She reports that she had bilious and constant vomiting throughout the day yesterday, which was followed by diarrhea, lightheadedness, and upper abdominal pain. She notes that she has had an increase in urine frequency and feeling as though she has not emptied her bladder all the way. She denies fever, chills, cough, chest pain, shortness of breath, dysuria, hematemesis, vaginal pain, vaginal discharge, known sick contacts, and falls/trauma. She did not take any analgesics at home PTA. Patient is sexually active, but had recent negative STI/STD screening. Patient admits to marijuana use for the past 5 years, occasional alcohol use, and tobacco use.    This is the extent to the patients complaints today here in the emergency department.  Past History     Review of patient's allergies indicates:   Allergen Reactions    Monistat 7 [miconazole-skin clnsr17] Itching     Itching and burning       No current facility-administered medications on file prior to encounter.     Current Outpatient Medications on File Prior to Encounter   Medication Sig Dispense Refill    albuterol (VENTOLIN HFA) 90 mcg/actuation inhaler Inhale 2 puffs into the lungs every 6 (six) hours as needed for Wheezing. Rescue 18 g 0    fluconazole (DIFLUCAN) 150 MG Tab TAKE 1 TABLET BY MOUTH ONCE DAILY. MAY REPEAT DOSE X 1 THREE DAYS AFTER FIRST DOSE FOR 2 DAYS (Patient not taking: Reported on 8/24/2023) 2 tablet 0    fluticasone propionate (FLONASE) 50 mcg/actuation nasal spray 2 sprays (100 mcg  total) by Each Nostril route daily as needed for Rhinitis (nasal congestion). 15.8 mL 0    valACYclovir (VALTREX) 500 MG tablet Take 2 tablets (1,000 mg total) by mouth once daily. 60 tablet 0       As per HPI and below:  Past Medical History:   Diagnosis Date    Herpes simplex virus (HSV) infection      No past surgical history on file.    Social History     Socioeconomic History    Marital status: Single   Tobacco Use    Smoking status: Every Day     Types: Vaping with nicotine    Smokeless tobacco: Never   Substance and Sexual Activity    Alcohol use: Yes     Comment: socially    Drug use: Yes     Types: Marijuana    Sexual activity: Yes     Partners: Male     Birth control/protection: Inserts       Family History   Problem Relation Age of Onset    Breast cancer Maternal Aunt     Colon cancer Neg Hx     Ovarian cancer Neg Hx        Physical Exam     Vitals:    03/29/24 0100 03/29/24 0202 03/29/24 0336 03/29/24 0425   BP:  136/79  136/81   BP Location:    Left arm   Patient Position:    Sitting   Pulse:  88  89   Resp:   18 19   Temp: 98.1 °F (36.7 °C)   98.5 °F (36.9 °C)   TempSrc: Oral   Oral   SpO2:  100%  99%   Weight:       Height:         Physical Exam:   Nursing note and vitals reviewed.  Appearance: Well-appearing, nontoxic female in no acute respiratory distress.  Making purposeful movements.  Speaking in full sentences.  Skin: No obvious rashes seen.  Good turgor.  No abrasions.  No ecchymoses.  Eyes: No conjunctival injection. EOMI, PERRL.  Head: NC/AT  Chest: CTAB. No increased work of breathing, bilateral chest rise.  Cardiovascular: Regular rate and rhythm.  Normal equal bilateral radial pulses.  Abdomen: Soft.  Not distended.  Negative McBurney's and Rovsing's sign.  Negative obturator/psoas test Nontender.  No guarding.  No rebound. No Masses   Musculoskeletal: No obvious deformities. Neck supple, full range of motion, no obvious deformity.   No tenderness to palpation of cervical through lumbar  spine.  No step-offs or deformities. Good range of motion all joints.  Neurologic: Moves all extremities.  Normal sensation.  No facial droop.  Normal speech.    Mental Status:  Alert and oriented x 3.  Appropriate, conversant.      Results and Medications    Procedures    Labs Reviewed   CBC W/ AUTO DIFFERENTIAL - Abnormal; Notable for the following components:       Result Value    RBC 5.64 (*)     MCV 80 (*)     Gran # (ANC) 9.8 (*)     Lymph # 0.5 (*)     Gran % 85.6 (*)     Lymph % 4.7 (*)     All other components within normal limits   COMPREHENSIVE METABOLIC PANEL - Abnormal; Notable for the following components:    CO2 21 (*)     Glucose 134 (*)     Total Bilirubin 1.6 (*)     All other components within normal limits   URINALYSIS, REFLEX TO URINE CULTURE - Abnormal; Notable for the following components:    Appearance, UA Hazy (*)     Specific Gravity, UA >=1.030 (*)     Protein, UA 1+ (*)     Ketones, UA 3+ (*)     Bilirubin (UA) 1+ (*)     Occult Blood UA 1+ (*)     All other components within normal limits    Narrative:     Specimen Source->Urine   URINALYSIS MICROSCOPIC - Abnormal; Notable for the following components:    RBC, UA 8 (*)     Bacteria Few (*)     All other components within normal limits    Narrative:     Specimen Source->Urine   LIPASE   POCT URINE PREGNANCY       Imaging Results              US Abdomen Limited (Final result)  Result time 03/29/24 04:12:20      Final result by Essence Melissa MD (03/29/24 04:12:20)                   Impression:      No significant sonographic abnormality.      Electronically signed by: Essence Melissa MD  Date:    03/29/2024  Time:    04:12               Narrative:    EXAMINATION:  US ABDOMEN LIMITED    CLINICAL HISTORY:  RUQ pain;    TECHNIQUE:  Limited ultrasound of the right upper quadrant of the abdomen focused on the biliary system was performed.    COMPARISON:  None    FINDINGS:  Gallbladder: No calculi, wall thickening, or pericholecystic fluid.  No  sonographic Mar's sign.    Biliary system: The common duct is not dilated, measuring 3 mm.  No intrahepatic ductal dilatation.    Pancreas: The visualized portions of pancreas appear normal.    Miscellaneous: No upper abdominal ascites and no abnormalities of the visualized liver.                                       CT Abdomen Pelvis With IV Contrast NO Oral Contrast (Final result)  Result time 03/29/24 04:00:47      Final result by Essence Melissa MD (03/29/24 04:00:47)                   Impression:      1. A few minimally prominent fluid-filled loops of small bowel throughout the abdomen and pelvis.  Findings can be seen with developing enteritis in the appropriate clinical setting.  Clinical correlation advised.  2. Normal and minimally prominent lymph nodes clustered within the right lower quadrant.  This is of uncertain etiology/clinical significance although correlation for symptoms of mesenteric adenitis advised.  3. Additional findings as above.      Electronically signed by: Essence Melissa MD  Date:    03/29/2024  Time:    04:00               Narrative:    EXAMINATION:  CT ABDOMEN PELVIS WITH IV CONTRAST    CLINICAL HISTORY:  Abdominal pain, acute, nonlocalized;    TECHNIQUE:  Low dose axial images, sagittal and coronal reformations were obtained from the lung bases to the pubic symphysis following the IV administration of 100 mL of Omnipaque 350 .  No oral contrast.    COMPARISON:  None.    FINDINGS:  The visualized lung bases are free of pleural fluid or focal consolidation.  The visualized portions of the heart and pericardium are within normal limits.    The liver demonstrates no focal abnormality.  There are no calcified stones in the gallbladder lumen.  No significant intra or extrahepatic biliary ductal dilatation.  The spleen, pancreas and adrenal glands are unremarkable.    The kidneys are normal in size and location enhance symmetrically.  No hydronephrosis.  The urinary bladder is within  normal limits for its given degree of distention.  The uterus and adnexal regions are within normal limits.    The abdominal aorta is nonaneurysmal.  Shotty periaortic lymph nodes present.    There are a few scattered minimally prominent fluid-filled loops of small bowel in the abdomen and pelvis which can be seen with developing enteritis in the appropriate clinical setting.  Visualized loops of large bowel demonstrate no evidence of obstruction or inflammatory change.  The appendix is unremarkable.  There are normal and minimally prominent mesenteric lymph nodes clustered in the right lower quadrant.  There is no free intraperitoneal air, portal venous gas or ascites.    Visualized osseous structures are intact.  Very small fat containing umbilical hernia.                                          Medications   ondansetron disintegrating tablet 4 mg (4 mg Oral Given 3/29/24 0152)   sodium chloride 0.9% bolus 1,000 mL 1,000 mL (0 mLs Intravenous Stopped 3/29/24 0337)   ondansetron injection 4 mg (4 mg Intravenous Given 3/29/24 0225)   morphine injection 2 mg (2 mg Intravenous Given 3/29/24 0336)   droPERidol injection 1.25 mg (1.25 mg Intravenous Given 3/29/24 0336)   sodium chloride 0.9% bolus 1,000 mL 1,000 mL (0 mLs Intravenous Stopped 3/29/24 0450)   iohexoL (OMNIPAQUE 350) injection 100 mL (100 mLs Intravenous Given 3/29/24 0323)       MDM, Impression and Plan     Independently Interpreted Test(s):   EKG:  I independently reviewed and interpreted the EKG and my findings are as follows:   Detailed here or in ED course.     Clinical Tests:   Lab Tests: Ordered and Reviewed  Radiological Study: Ordered and Reviewed  Medical Tests: Ordered and Reviewed    Differential diagnosis:  -Unlikely ovarian torsion  -Unlikely appendicitis based off of physical exam  -UTI   -hepatobiliary pathology   -hyperemesis syndrome   -electrolyte abnormalities    Initial Assessment & ED Management:    Urgent evaluation of 19 y.o.  female with no significant history who presents to the ED with chief complaint of nausea and vomiting onset 12 PM yesterday; patient also complaining of associated abdominal pain, diarrhea, and lightheadedness. She presents afebrile and hemodynamically stable.  Patient's tachycardia resolved with IV fluids.  Her nausea resolved with multiple antiemetics including Zofran and droperidol and her pain subsided with morphine.  Her CMP was grossly unremarkable but has a mild elevation of her T bili which I believe is due to dehydration.  Her lipase was in normal limits making pancreatitis unlikely.  Urinalysis not consistent with UTI urine pregnancy negative.  Hemoglobin near baseline no leukocytosis.  CT abdomen pelvis shows signs concerning for enteritis and her right upper quadrant ultrasound showed no acute abnormalities with a negative sonographic Mar's.  She p.o. challenged successfully in her vitals remained stable.  She requested Zofran at time of discharge.  Ambulatory referral to GI placed.  Repeat abdominal exam benign.  Care plan addressed with patient and all those present. All questions answered.  Strict return precautions discussed.  Patient was instructed on the correct follow-up time and route.  They voiced verbal understanding and agreement  with the plan and were deemed stable for discharge.       Medical Decision Making  Amount and/or Complexity of Data Reviewed  Labs: ordered.  Radiology: ordered.    Risk  Prescription drug management.           Please see ED course for discussion of workup and dispo if not listed above.          Final diagnoses:  [Z91.89] At risk for prolonged QT interval syndrome  [R10.9] Abdominal pain, unspecified abdominal location (Primary)  [R11.2] Nausea and vomiting, unspecified vomiting type        ED Disposition Condition    Discharge Stable          ED Prescriptions       Medication Sig Dispense Start Date End Date Auth. Provider    ondansetron (ZOFRAN) 4 MG tablet Take  1 tablet (4 mg total) by mouth every 6 (six) hours as needed for Nausea. 12 tablet 3/29/2024 -- Julio Giordaon MD          Follow-up Information       Follow up With Specialties Details Why Contact Info    RolanColette Mac Sellers  Schedule an appointment as soon as possible for a visit in 2 days or the primary care doctor of your choice [address]  [facility] [address]  [phone]      Filipe Anaya MD Gastroenterology Schedule an appointment as soon as possible for a visit in 2 days  [address] [place] [address]  [id number]  [facility]  [facility] [address]  [phone]              ED Course as of 03/29/24 0511   Fri Mar 29, 2024   0234 EKG shows normal sinus rhythm with sinus arrhythmia and ventricular rate of 81 beats per minute.  Narrow QRS.  No ST segment elevations or depressions.  Noted movement artifact.  .  No STEMI [HM]      ED Course User Index  [HM] Julio Giordano MD          I, Crystal Whittington, scribed for, and in the presence of, Julio Giordano MD. I performed the scribed service and the documentation accurately describes the services I performed. I attest to the accuracy of the note.     Physician Attestation for Scribe: I, Julio Giordano MD , reviewed documentation as scribed in my presence, which is both accurate and complete.    MD Pasquale Crawley Heyward B, MD  03/29/24 0511

## 2024-03-29 NOTE — DISCHARGE INSTRUCTIONS
Diagnosis:   1. Abdominal pain, unspecified abdominal location    2. At risk for prolonged QT interval syndrome    3. Nausea and vomiting, unspecified vomiting type        Tests you had showed:   Labs Reviewed   CBC W/ AUTO DIFFERENTIAL - Abnormal; Notable for the following components:       Result Value    RBC 5.64 (*)     MCV 80 (*)     Gran # (ANC) 9.8 (*)     Lymph # 0.5 (*)     Gran % 85.6 (*)     Lymph % 4.7 (*)     All other components within normal limits   COMPREHENSIVE METABOLIC PANEL - Abnormal; Notable for the following components:    CO2 21 (*)     Glucose 134 (*)     Total Bilirubin 1.6 (*)     All other components within normal limits   URINALYSIS, REFLEX TO URINE CULTURE - Abnormal; Notable for the following components:    Appearance, UA Hazy (*)     Specific Gravity, UA >=1.030 (*)     Protein, UA 1+ (*)     Ketones, UA 3+ (*)     Bilirubin (UA) 1+ (*)     Occult Blood UA 1+ (*)     All other components within normal limits    Narrative:     Specimen Source->Urine   URINALYSIS MICROSCOPIC - Abnormal; Notable for the following components:    RBC, UA 8 (*)     Bacteria Few (*)     All other components within normal limits    Narrative:     Specimen Source->Urine   LIPASE   POCT URINE PREGNANCY      US Abdomen Limited   Final Result      No significant sonographic abnormality.         Electronically signed by: Essence Melissa MD   Date:    03/29/2024   Time:    04:12      CT Abdomen Pelvis With IV Contrast NO Oral Contrast   Final Result      1. A few minimally prominent fluid-filled loops of small bowel throughout the abdomen and pelvis.  Findings can be seen with developing enteritis in the appropriate clinical setting.  Clinical correlation advised.   2. Normal and minimally prominent lymph nodes clustered within the right lower quadrant.  This is of uncertain etiology/clinical significance although correlation for symptoms of mesenteric adenitis advised.   3. Additional findings as above.          Electronically signed by: Essence Melissa MD   Date:    03/29/2024   Time:    04:00          Treatments you received were:   Medications   ondansetron disintegrating tablet 4 mg (4 mg Oral Given 3/29/24 0152)   sodium chloride 0.9% bolus 1,000 mL 1,000 mL (0 mLs Intravenous Stopped 3/29/24 0337)   ondansetron injection 4 mg (4 mg Intravenous Given 3/29/24 0225)   morphine injection 2 mg (2 mg Intravenous Given 3/29/24 0336)   droPERidol injection 1.25 mg (1.25 mg Intravenous Given 3/29/24 0336)   sodium chloride 0.9% bolus 1,000 mL 1,000 mL (1,000 mLs Intravenous New Bag 3/29/24 0337)   iohexoL (OMNIPAQUE 350) injection 100 mL (100 mLs Intravenous Given 3/29/24 0323)       Home Care Instructions:  - Medications: Continue taking your home medications as prescribed    Follow-Up Plan:  - Follow-up with: Primary care doctor within 1-2  days  - Additional testing and/or evaluation will be directed by your primary doctor    Return to the Emergency Department for symptoms including but not limited to: worsening symptoms, severe back pain, shortness of breath or chest pain, vomiting with inability to hold down fluids, blood in vomit or poop, fevers greater than 100.4°F, passing out/fainting/unconsciousness, or other concerning symptoms.     No future appointments.

## 2024-03-29 NOTE — ED TRIAGE NOTES
19 YOF presents to ED with c/o generalized body ache/n/v/d that began yesterday. -red tint. A&Ox4. Denies any other complaints.     LOC: The patient is awake, alert and aware of environment with an appropriate affect, the patient is oriented x 3.  APPEARANCE: Patient resting comfortably and in no acute distress, patient is clean and well groomed, patient's clothing is properly fastened.  SKIN: The skin is warm and dry, patient has normal skin turgor and moist mucus membranes, skin intact, no breakdown or brusing noted.  MUSKULOSKELETAL: Patient moving all extremities well, no obvious swelling or deformities noted.  RESPIRATORY: Airway is open and patent, respirations are spontaneous, patient has a normal effort and rate.  CARDIAC: No peripheral edema.  ABDOMEN: Soft and no tenderness to palpation, no distention noted.     ED workup in progress. VSS. Safety measures in place; side rails up x2. Call light within pt reach. Will continue to monitor.

## 2024-03-31 LAB
OHS QRS DURATION: 78 MS
OHS QTC CALCULATION: 469 MS

## 2025-02-10 ENCOUNTER — OFFICE VISIT (OUTPATIENT)
Dept: OBSTETRICS AND GYNECOLOGY | Facility: CLINIC | Age: 20
End: 2025-02-10
Payer: COMMERCIAL

## 2025-02-10 VITALS — DIASTOLIC BLOOD PRESSURE: 82 MMHG | BODY MASS INDEX: 40.3 KG/M2 | WEIGHT: 257.25 LBS | SYSTOLIC BLOOD PRESSURE: 130 MMHG

## 2025-02-10 DIAGNOSIS — Z12.39 SCREENING BREAST EXAMINATION: ICD-10-CM

## 2025-02-10 DIAGNOSIS — Z01.419 ENCOUNTER FOR GYNECOLOGICAL EXAMINATION WITHOUT ABNORMAL FINDING: Primary | ICD-10-CM

## 2025-02-10 DIAGNOSIS — Z86.19 HISTORY OF CHLAMYDIA: ICD-10-CM

## 2025-02-10 PROCEDURE — 99999 PR PBB SHADOW E&M-EST. PATIENT-LVL III: CPT | Mod: PBBFAC,,,

## 2025-02-10 PROCEDURE — 81000 URINALYSIS NONAUTO W/SCOPE: CPT

## 2025-02-10 PROCEDURE — 87591 N.GONORRHOEAE DNA AMP PROB: CPT

## 2025-02-10 PROCEDURE — 88175 CYTOPATH C/V AUTO FLUID REDO: CPT

## 2025-02-10 NOTE — PROGRESS NOTES
CC: Annual  HISTORY OF PRESENT ILLNESS:    Zoie Montes is a 19 y.o. female, , Patient's last menstrual period was 2025.,  presents for a routine exam.  She is sexually active- last encounter was 2 months ago. She uses NFP for contraception.  History of STDs: reports CT 2024, HSV.  She does want STD screening.  Denies any other GYN complaints.      The patient participates in regular exercise: yes.  The patient does smoke (vapes)- refuses smoking cessation referral need today.  The patient wears seatbelts.   Pt denies any domestic violence. reports tattoos or blood transfusions    SCREENING:   Pap: first today   Gardasil Status: unsure  Mammogram: N/A  Colonoscopy: N/A   DEXA:  N/A     GYN FH:   Breast cancer: mat aunt  Colon cancer: none  Ovarian cancer: none  Cervical cancer: mother  Endometrial cancer: none     OB History    Para Term  AB Living   0 0 0 0 0 0   SAB IAB Ectopic Multiple Live Births   0 0 0 0 0        Past Medical History:  Past Medical History:   Diagnosis Date    Herpes simplex virus (HSV) infection        Past Surgical History:  History reviewed. No pertinent surgical history.    Family History:  Family History   Problem Relation Name Age of Onset    Cervical cancer Mother      Hypertension Father      Pneumonia Sister      No Known Problems Maternal Grandmother      Diabetes Maternal Grandfather      Hypertension Paternal Grandmother      Hypertension Paternal Grandfather      Breast cancer Maternal Aunt      Colon cancer Neg Hx      Ovarian cancer Neg Hx         Allergies:  Review of patient's allergies indicates:   Allergen Reactions    Monistat 7 [miconazole-skin clnsr17] Itching     Itching and burning       Medications:  Current Outpatient Medications on File Prior to Visit   Medication Sig Dispense Refill    albuterol (VENTOLIN HFA) 90 mcg/actuation inhaler Inhale 2 puffs into the lungs every 6 (six) hours as needed for Wheezing. Rescue 18 g 0     fluconazole (DIFLUCAN) 150 MG Tab TAKE 1 TABLET BY MOUTH ONCE DAILY. MAY REPEAT DOSE X 1 THREE DAYS AFTER FIRST DOSE FOR 2 DAYS (Patient not taking: Reported on 8/24/2023) 2 tablet 0    fluticasone propionate (FLONASE) 50 mcg/actuation nasal spray 2 sprays (100 mcg total) by Each Nostril route daily as needed for Rhinitis (nasal congestion). 15.8 mL 0    ondansetron (ZOFRAN) 4 MG tablet Take 1 tablet (4 mg total) by mouth every 6 (six) hours as needed for Nausea. 12 tablet 0    valACYclovir (VALTREX) 500 MG tablet Take 2 tablets (1,000 mg total) by mouth once daily. 60 tablet 0     No current facility-administered medications on file prior to visit.       Social History:  Social History     Tobacco Use    Smoking status: Every Day     Types: Vaping with nicotine    Smokeless tobacco: Never   Substance Use Topics    Alcohol use: Yes     Comment: socially    Drug use: Yes     Types: Marijuana               ROS:   GENERAL: Feeling well overall. Denies fever or chills.   SKIN: Denies rash or lesions.   HEAD: Denies head injury or headache.   NODES: Denies enlarged lymph nodes.   CHEST: Denies chest pain or shortness of breath.   CARDIOVASCULAR: Denies palpitations or left sided chest pain.   ABDOMEN: No abdominal pain, constipation, diarrhea, nausea, vomiting or rectal bleeding.   URINARY: No dysuria, hematuria, or burning on urination.  REPRODUCTIVE: See HPI.   BREASTS: Denies pain, lumps, or nipple discharge.   HEMATOLOGIC: No easy bruisability or excessive bleeding.   MUSCULOSKELETAL: Denies joint pain or swelling.   NEUROLOGIC: Denies syncope or weakness.   PSYCHIATRIC: Denies depression, anxiety or mood swings.    PE:   APPEARANCE: Well nourished, well developed, White female in no acute distress.  NODES: no cervical, supraclavicular, or inguinal lymphadenopathy  BREASTS: Symmetrical, no skin changes or visible lesions. No palpable masses, nipple discharge or adenopathy bilaterally.  ABDOMEN: Soft. No tenderness or  masses. No distention. No hernias palpated. No CVA tenderness.  VULVA: No lesions. Normal external female genitalia.  URETHRAL MEATUS: Normal size and location, no lesions, no prolapse.  URETHRA: No masses, tenderness, or prolapse.  VAGINA:   Moist. No lesions or lacerations noted. No abnormal discharge present. No odor present.   CERVIX: No lesions or discharge. No cervical motion tenderness.   UTERUS: Normal size, regular shape, mobile, non-tender.  ADNEXA: No tenderness. No fullness or masses palpated in the adnexal regions.   ANUS PERINEUM: Normal.      Diagnosis:  1. Encounter for gynecological examination without abnormal finding    2. Screening breast examination    3. History of chlamydia        Plan:     Orders Placed This Encounter    C. trachomatis/N. gonorrhoeae by AMP DNA    Treponema Pallidium Antibodies IgG, IgM    Hepatitis B Surface Antigen    Hepatitis C Antibody    HIV 1/2 Ag/Ab (4th Gen)    Liquid-Based Pap Smear, Screening       - Pap and HPV done today.  - Screening tests as ordered.  - Diet and exercise encouraged.  Condom use encouraged for STD prevention.  Seat belt use encouraged.  Reviewed ASCCP Pap guidelines and screening recommendations.  Calcium and vitamin D recommended.     Counseling: injury prevention: Driving under the influence of alcohol  Weapons  Seatbelts  Bicycle helmets  Adequate sleep  Substance use  Exercise  Stress management techniques  indications for and frequency of periodic gynecologic exam  reviewed current Pap guidelines. Explained new understanding of natural history of cervical disease and improved Paps. Recommended guideline concordant care.    The patient was counseled today on ACS PAP guidelines, with recommendations for yearly pelvic exams unless their uterus, cervix, and ovaries were removed for benign reasons; in that case, examinations every 3-5 years are recommended.  The patient was also counseled regarding monthly breast self-examination, routine STD  screening for at-risk populations, prophylactic immunizations for transmitted infections such as  HPV, Pertussis, or Influenza as appropriate, and yearly mammograms when indicated by ACS guidelines.  She was advised to see her primary care physician for all other health maintenance.    Counseling session lasted approximately 10 minutes, and all her questions were answered.    Follow-up with me in 1 year for routine exam or sooner if needed.    Josue Sharp, JENNIFER-BC

## 2025-02-11 ENCOUNTER — OFFICE VISIT (OUTPATIENT)
Facility: CLINIC | Age: 20
End: 2025-02-11
Payer: COMMERCIAL

## 2025-02-11 ENCOUNTER — LAB VISIT (OUTPATIENT)
Dept: LAB | Facility: HOSPITAL | Age: 20
End: 2025-02-11
Payer: COMMERCIAL

## 2025-02-11 VITALS
SYSTOLIC BLOOD PRESSURE: 122 MMHG | RESPIRATION RATE: 18 BRPM | OXYGEN SATURATION: 98 % | DIASTOLIC BLOOD PRESSURE: 80 MMHG | TEMPERATURE: 98 F | WEIGHT: 257.19 LBS | HEART RATE: 96 BPM | BODY MASS INDEX: 40.37 KG/M2 | HEIGHT: 67 IN

## 2025-02-11 DIAGNOSIS — Z00.00 ANNUAL PHYSICAL EXAM: Primary | ICD-10-CM

## 2025-02-11 DIAGNOSIS — F41.9 ANXIETY AND DEPRESSION: ICD-10-CM

## 2025-02-11 DIAGNOSIS — F32.A ANXIETY AND DEPRESSION: ICD-10-CM

## 2025-02-11 DIAGNOSIS — Z00.00 ANNUAL PHYSICAL EXAM: ICD-10-CM

## 2025-02-11 DIAGNOSIS — M54.50 MIDLINE LOW BACK PAIN WITHOUT SCIATICA, UNSPECIFIED CHRONICITY: ICD-10-CM

## 2025-02-11 DIAGNOSIS — Z11.3 SCREENING FOR STD (SEXUALLY TRANSMITTED DISEASE): ICD-10-CM

## 2025-02-11 DIAGNOSIS — M79.644 FINGER PAIN, RIGHT: ICD-10-CM

## 2025-02-11 DIAGNOSIS — Z13.84 ENCOUNTER FOR SCREENING FOR DENTAL DISORDER: ICD-10-CM

## 2025-02-11 DIAGNOSIS — B00.9 HERPES SIMPLEX TYPE 2 INFECTION: ICD-10-CM

## 2025-02-11 LAB
ALBUMIN SERPL BCP-MCNC: 4.1 G/DL (ref 3.5–5.2)
ALP SERPL-CCNC: 75 U/L (ref 40–150)
ALT SERPL W/O P-5'-P-CCNC: 13 U/L (ref 10–44)
ANION GAP SERPL CALC-SCNC: 9 MMOL/L (ref 8–16)
AST SERPL-CCNC: 14 U/L (ref 10–40)
BACTERIA #/AREA URNS HPF: ABNORMAL /HPF
BASOPHILS # BLD AUTO: 0.06 K/UL (ref 0–0.2)
BASOPHILS NFR BLD: 0.6 % (ref 0–1.9)
BILIRUB SERPL-MCNC: 0.3 MG/DL (ref 0.1–1)
BILIRUB UR QL STRIP: NEGATIVE
BUN SERPL-MCNC: 14 MG/DL (ref 6–20)
CALCIUM SERPL-MCNC: 9.4 MG/DL (ref 8.7–10.5)
CHLORIDE SERPL-SCNC: 107 MMOL/L (ref 95–110)
CHOLEST SERPL-MCNC: 132 MG/DL (ref 120–199)
CHOLEST/HDLC SERPL: 2.9 {RATIO} (ref 2–5)
CLARITY UR: CLEAR
CO2 SERPL-SCNC: 21 MMOL/L (ref 23–29)
COLOR UR: YELLOW
CREAT SERPL-MCNC: 0.8 MG/DL (ref 0.5–1.4)
DIFFERENTIAL METHOD BLD: NORMAL
EOSINOPHIL # BLD AUTO: 0.2 K/UL (ref 0–0.5)
EOSINOPHIL NFR BLD: 2.3 % (ref 0–8)
ERYTHROCYTE [DISTWIDTH] IN BLOOD BY AUTOMATED COUNT: 13.2 % (ref 11.5–14.5)
EST. GFR  (NO RACE VARIABLE): >60 ML/MIN/1.73 M^2
GLUCOSE SERPL-MCNC: 93 MG/DL (ref 70–110)
GLUCOSE UR QL STRIP: NEGATIVE
HCT VFR BLD AUTO: 43.2 % (ref 37–48.5)
HDLC SERPL-MCNC: 45 MG/DL (ref 40–75)
HDLC SERPL: 34.1 % (ref 20–50)
HGB BLD-MCNC: 14.6 G/DL (ref 12–16)
HGB UR QL STRIP: ABNORMAL
IMM GRANULOCYTES # BLD AUTO: 0.03 K/UL (ref 0–0.04)
IMM GRANULOCYTES NFR BLD AUTO: 0.3 % (ref 0–0.5)
KETONES UR QL STRIP: NEGATIVE
LDLC SERPL CALC-MCNC: 73.2 MG/DL (ref 63–159)
LEUKOCYTE ESTERASE UR QL STRIP: NEGATIVE
LYMPHOCYTES # BLD AUTO: 2.5 K/UL (ref 1–4.8)
LYMPHOCYTES NFR BLD: 26.9 % (ref 18–48)
MCH RBC QN AUTO: 27.8 PG (ref 27–31)
MCHC RBC AUTO-ENTMCNC: 33.8 G/DL (ref 32–36)
MCV RBC AUTO: 82 FL (ref 82–98)
MICROSCOPIC COMMENT: ABNORMAL
MONOCYTES # BLD AUTO: 0.6 K/UL (ref 0.3–1)
MONOCYTES NFR BLD: 6.7 % (ref 4–15)
NEUTROPHILS # BLD AUTO: 5.9 K/UL (ref 1.8–7.7)
NEUTROPHILS NFR BLD: 63.2 % (ref 38–73)
NITRITE UR QL STRIP: NEGATIVE
NONHDLC SERPL-MCNC: 87 MG/DL
NRBC BLD-RTO: 0 /100 WBC
PH UR STRIP: 6 [PH] (ref 5–8)
PLATELET # BLD AUTO: 279 K/UL (ref 150–450)
PMV BLD AUTO: 10.5 FL (ref 9.2–12.9)
POTASSIUM SERPL-SCNC: 3.8 MMOL/L (ref 3.5–5.1)
PROT SERPL-MCNC: 7.5 G/DL (ref 6–8.4)
PROT UR QL STRIP: NEGATIVE
RBC # BLD AUTO: 5.25 M/UL (ref 4–5.4)
RBC #/AREA URNS HPF: 6 /HPF (ref 0–4)
SODIUM SERPL-SCNC: 137 MMOL/L (ref 136–145)
SP GR UR STRIP: 1.02 (ref 1–1.03)
SQUAMOUS #/AREA URNS HPF: 0 /HPF
TRIGL SERPL-MCNC: 69 MG/DL (ref 30–150)
TSH SERPL DL<=0.005 MIU/L-ACNC: 1.23 UIU/ML (ref 0.4–4)
URN SPEC COLLECT METH UR: ABNORMAL
UROBILINOGEN UR STRIP-ACNC: NEGATIVE EU/DL
WBC # BLD AUTO: 9.38 K/UL (ref 3.9–12.7)
WBC #/AREA URNS HPF: 3 /HPF (ref 0–5)

## 2025-02-11 PROCEDURE — 1160F RVW MEDS BY RX/DR IN RCRD: CPT | Mod: CPTII,S$GLB,,

## 2025-02-11 PROCEDURE — 80061 LIPID PANEL: CPT

## 2025-02-11 PROCEDURE — 3074F SYST BP LT 130 MM HG: CPT | Mod: CPTII,S$GLB,,

## 2025-02-11 PROCEDURE — 87340 HEPATITIS B SURFACE AG IA: CPT

## 2025-02-11 PROCEDURE — 85025 COMPLETE CBC W/AUTO DIFF WBC: CPT

## 2025-02-11 PROCEDURE — 3079F DIAST BP 80-89 MM HG: CPT | Mod: CPTII,S$GLB,,

## 2025-02-11 PROCEDURE — 1159F MED LIST DOCD IN RCRD: CPT | Mod: CPTII,S$GLB,,

## 2025-02-11 PROCEDURE — 3044F HG A1C LEVEL LT 7.0%: CPT | Mod: CPTII,S$GLB,,

## 2025-02-11 PROCEDURE — 36415 COLL VENOUS BLD VENIPUNCTURE: CPT

## 2025-02-11 PROCEDURE — 99213 OFFICE O/P EST LOW 20 MIN: CPT | Mod: 25,S$GLB,,

## 2025-02-11 PROCEDURE — 99999 PR PBB SHADOW E&M-EST. PATIENT-LVL IV: CPT | Mod: PBBFAC,,,

## 2025-02-11 PROCEDURE — 87389 HIV-1 AG W/HIV-1&-2 AB AG IA: CPT

## 2025-02-11 PROCEDURE — 3008F BODY MASS INDEX DOCD: CPT | Mod: CPTII,S$GLB,,

## 2025-02-11 PROCEDURE — 84443 ASSAY THYROID STIM HORMONE: CPT

## 2025-02-11 PROCEDURE — 83036 HEMOGLOBIN GLYCOSYLATED A1C: CPT

## 2025-02-11 PROCEDURE — 86803 HEPATITIS C AB TEST: CPT

## 2025-02-11 PROCEDURE — 80053 COMPREHEN METABOLIC PANEL: CPT

## 2025-02-11 PROCEDURE — 86593 SYPHILIS TEST NON-TREP QUANT: CPT

## 2025-02-11 PROCEDURE — 99395 PREV VISIT EST AGE 18-39: CPT | Mod: 1E,GY,S$GLB,

## 2025-02-11 RX ORDER — IBUPROFEN 400 MG/1
400 TABLET ORAL EVERY 4 HOURS
Qty: 25 TABLET | Refills: 0 | Status: SHIPPED | OUTPATIENT
Start: 2025-02-11 | End: 2025-02-16

## 2025-02-11 RX ORDER — VALACYCLOVIR HYDROCHLORIDE 500 MG/1
1000 TABLET, FILM COATED ORAL DAILY
Qty: 60 TABLET | Refills: 0 | Status: SHIPPED | OUTPATIENT
Start: 2025-02-11 | End: 2025-03-13

## 2025-02-11 RX ORDER — LIDOCAINE 50 MG/G
1 PATCH TOPICAL DAILY
Qty: 10 PATCH | Refills: 0 | Status: SHIPPED | OUTPATIENT
Start: 2025-02-11 | End: 2025-02-21

## 2025-02-12 LAB
CLINICAL INFO: NORMAL
DATE OF PREVIOUS PAP: NORMAL
DATE PREVIOUS BX: NO
ESTIMATED AVG GLUCOSE: 105 MG/DL (ref 68–131)
HBA1C MFR BLD: 5.3 % (ref 4–5.6)
HBV SURFACE AG SERPL QL IA: NORMAL
HCV AB SERPL QL IA: NORMAL
HIV 1+2 AB+HIV1 P24 AG SERPL QL IA: NORMAL
LMP START DATE: NORMAL
SPECIMEN SOURCE CVX/VAG CYTO: NORMAL
TREPONEMA PALLIDUM IGG+IGM AB [PRESENCE] IN SERUM OR PLASMA BY IMMUNOASSAY: NONREACTIVE

## 2025-02-13 NOTE — PROGRESS NOTES
SUBJECTIVE     Chief Complaint   Patient presents with    Establish Care     Pt states she is coming in for a general women check up       HPI  Zoie Montes is a 19 y.o. female with multiple medical diagnoses as listed in the medical history and problem list that presents for establishment of care.     History of Present Illness    CHIEF COMPLAINT:  Ms. Montes presents today for a new patient visit and follow-up on various health concerns    CURRENT SYMPTOMS:  She reports morning GI discomfort with occasional unpleasant bowel movements. She had a previous episode of severe nausea and vomiting after work that progressed to dry heaving and inability to keep anything down. She also reports low back pain attributed to weight gain exacerbating an untreated sports-related injury.    MENTAL HEALTH:  She reports experiencing higher levels of stress recently, which she attributes to anxiety and depression. This is in the context of significant life changes, including the loss of her sister on July 1st and starting a new career. She expresses struggling with stress and anxiety but prefers to avoid medication for depression and anxiety.    MEDICAL HISTORY:  She has a history of recurrent yeast infections and documented Herpes simplex virus, managed with Valtrex (Valacyclovir) as needed. She has seasonal allergies but denies current use of Flonase.    RECENT MEDICAL CARE:  She had her first appointment with a new gynecologist yesterday, including an annual exam, first pap smear, and STI testing including HIV labs.    MUSCULOSKELETAL:  She has limited mobility in her right hand finger following a 2018 surgery for a cut tendon. She did not attend recommended post-surgery physical therapy, resulting in scar tissue formation. She notes recent worsening of symptoms, including decreased  strength and reduced range of motion in the affected finger.    FAMILY HISTORY:  Mother had cervical cancer, father had hypertension.  Diabetes present on maternal paternal side. Sister passed away from walking pneumonia.    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 She continues to vape and expresses desire to quit. She reports social alcohol use and occasional marijuana use.      ROS:  General: -fever, -chills, -fatigue, -weight gain, -weight loss  Eyes: -vision changes, -redness, -discharge  ENT: -ear pain, -nasal congestion, -sore throat  Cardiovascular: -chest pain, -palpitations, -lower extremity edema  Respiratory: -cough, -shortness of breath  Gastrointestinal: -abdominal pain, +nausea, +vomiting, -diarrhea, -constipation, -blood in stool, +change in bowel habits  Genitourinary: -dysuria, -hematuria, -frequency  Musculoskeletal: -joint pain, -muscle pain, +back pain  Skin: -rash, -lesion  Neurological: -headache, -dizziness, -numbness, -tingling  Psychiatric: +anxiety, +depression, -sleep difficulty         PAST MEDICAL HISTORY:  Past Medical History:   Diagnosis Date    Herpes simplex virus (HSV) infection     Yeast infection        PAST SURGICAL HISTORY:  Past Surgical History:   Procedure Laterality Date    right finger Right 2018    procedure       SOCIAL HISTORY:  Social History     Socioeconomic History    Marital status: Single   Occupational History    Occupation:  [employer] 1st grade   Tobacco Use    Smoking status: Every Day     Types: Vaping with nicotine    Smokeless tobacco: Never   Substance and Sexual Activity    Alcohol use: Yes     Alcohol/week: 4.0 standard drinks of alcohol     Types: 1 Cans of beer, 3 Shots of liquor per week     Comment: socially    Drug use: Yes     Types: Marijuana     Comment: occassionally    Sexual activity: Yes     Partners: Male     Birth control/protection: None   Social History Narrative    Single    Lives with 2 roomates    Undergrad in Psychology- plans to go back for different degree.        She is a   in [employer] [facility] [facility] [address]     Social Drivers of Health     Financial Resource Strain: Medium Risk (2/9/2025)    Overall Financial Resource Strain (CARDIA)     Difficulty of Paying Living Expenses: Somewhat hard   Food Insecurity: Food Insecurity Present (2/9/2025)    Hunger Vital Sign     Worried About Running Out of Food in the Last Year: Sometimes true     Ran Out of Food in the Last Year: Sometimes true   Physical Activity: Insufficiently Active (2/9/2025)    Exercise Vital Sign     Days of Exercise per Week: 4 days     Minutes of Exercise per Session: 20 min   Stress: Stress Concern Present (2/9/2025)    [employer] [place] [employer] - Occupational Stress Questionnaire     Feeling of Stress : Very much   Housing Stability: Unknown (2/9/2025)    Housing Stability Vital Sign     Unable to Pay for Housing in the Last Year: No       FAMILY HISTORY:  Family History   Problem Relation Name Age of Onset    Cervical cancer Mother      Hypertension Father      Pneumonia Sister      No Known Problems Maternal Grandmother      Diabetes Maternal Grandfather      Hypertension Paternal Grandmother      Hypertension Paternal Grandfather      Breast cancer Maternal Aunt      Colon cancer Neg Hx      Ovarian cancer Neg Hx         ALLERGIES AND MEDICATIONS: updated and reviewed.  Review of patient's allergies indicates:   Allergen Reactions    Tioconazole Other (See Comments)    Miconazole-skin clnsr17 Itching     Itching and burning    Itching and burning   Itching and burning     Current Outpatient Medications   Medication Sig Dispense Refill    ibuprofen (ADVIL,MOTRIN) 400 MG tablet Take 1 tablet (400 mg total) by mouth every 4 (four) hours. for 25 doses 25 tablet 0    LIDOcaine (LIDODERM) 5 % Place 1 patch onto the skin once daily. Remove & Discard patch within 12 hours or as directed by MD for 10 doses 10 patch 0    valACYclovir (VALTREX) 500 MG tablet Take 2 tablets (1,000 mg total) by mouth once  "daily. 60 tablet 0     No current facility-administered medications for this visit.     OBJECTIVE     Physical Exam  Vitals:    02/11/25 1528   BP: 122/80   Pulse: 96   Resp: 18   Temp: 98 °F (36.7 °C)    Body mass index is 40.28 kg/m².  Weight: 116.7 kg (257 lb 2.7 oz)   Height: 5' 7" (170.2 cm)     Physical Exam  Constitutional:       Appearance: Normal appearance.   HENT:      Right Ear: Tympanic membrane normal. There is no impacted cerumen.      Left Ear: Tympanic membrane normal. There is no impacted cerumen.      Nose: Nose normal. No congestion or rhinorrhea.      Mouth/Throat:      Mouth: Mucous membranes are moist.      Pharynx: Oropharynx is clear.   Eyes:      General:         Right eye: No discharge.         Left eye: No discharge.      Conjunctiva/sclera: Conjunctivae normal.   Cardiovascular:      Rate and Rhythm: Normal rate and regular rhythm.      Pulses: Normal pulses.      Heart sounds: Normal heart sounds.   Pulmonary:      Effort: Pulmonary effort is normal. No respiratory distress.      Breath sounds: Normal breath sounds. No wheezing.   Chest:      Chest wall: No tenderness.   Abdominal:      General: Bowel sounds are normal. There is no distension.      Palpations: Abdomen is soft.      Tenderness: There is no abdominal tenderness.   Musculoskeletal:         General: Tenderness present. No swelling or signs of injury. Normal range of motion.      Cervical back: Normal range of motion. No rigidity or tenderness.      Right lower leg: No edema.      Left lower leg: No edema.   Skin:     General: Skin is warm and dry.      Coloration: Skin is not pale.      Findings: No erythema, lesion or rash.   Neurological:      Mental Status: She is alert and oriented to person, place, and time.   Psychiatric:         Mood and Affect: Mood normal.         Behavior: Behavior normal.           Health Maintenance         Date Due Completion Date    HPV Vaccines (1 - 3-dose series) Never done ---    TETANUS " VACCINE 02/11/2026 (Originally 2/22/2021) ---    Pneumococcal Vaccines (Age 0-49) (1 of 2 - PCV) 02/11/2026 (Originally 2/22/2022) ---    Chlamydia Screening 01/11/2026 1/11/2025    Pap Smear 02/10/2028 2/10/2025    RSV Vaccine (Age 60+ and Pregnant patients) (1 - 1-dose 75+ series) 02/22/2078 ---              ASSESSMENT     19 y.o. female with     1. Annual physical exam    2. Encounter for screening for dental disorder    3. Herpes simplex type 2 infection    4. Midline low back pain without sciatica, unspecified chronicity    5. Finger pain, right    6. Screening for STD (sexually transmitted disease)    7. Anxiety and depression        PLAN:     1. Annual physical exam  Counseled on age appropriate medical preventative services, including age appropriate cancer screenings, over all nutritional health, need for a consistent exercise regimen and an over all push towards maintaining a vigorous and active lifestyle.  Counseled on age appropriate vaccines and discussed upcoming health care needs based on age/gender.  Spent time with patient counseling on need for a good patient/doctor relationship moving forward.  Discussed use of common OTC medications and supplements.  Discussed common dietary aids and use of caffeine and the need for good sleep hygiene and stress management.  - CBC Auto Differential; Future  - Comprehensive Metabolic Panel; Future  - Hemoglobin A1C; Future  - Lipid Panel; Future  - TSH; Future    2. Encounter for screening for dental disorder  Oral health screening completed at today's visit. All questions/concerns addressed.     3. Herpes simplex type 2 infection  New; Treating  - valACYclovir (VALTREX) 500 MG tablet; Take 2 tablets (1,000 mg total) by mouth once daily.  Dispense: 60 tablet; Refill: 0    4. Midline low back pain without sciatica, unspecified chronicity  New; treating  - ibuprofen (ADVIL,MOTRIN) 400 MG tablet; Take 1 tablet (400 mg total) by mouth every 4 (four) hours. for 25  doses  Dispense: 25 tablet; Refill: 0  - LIDOcaine (LIDODERM) 5 %; Place 1 patch onto the skin once daily. Remove & Discard patch within 12 hours or as directed by MD for 10 doses  Dispense: 10 patch; Refill: 0    5. Finger pain, right  New; Addressing  - Ambulatory Referral/Consult to Physical Therapy/Occupational Therapy; Future    6. Screening for STD (sexually transmitted disease)  New; Assessing  - HIV 1/2 Ag/Ab (4th Gen); Future  - HEPATITIS C ANTIBODY; Future  - HEPATITIS B SURFACE ANTIGEN; Future  - Treponema Pallidium Antibodies IgG, IgM; Future    7. Anxiety and depression  New; Assessing    Assessment & Plan    IMPRESSION:  - Evaluated new patient for annual physical and addressed multiple concerns  - Reviewed patient's history of herpes simplex virus and yeast infections  - Assessed recent weight gain and considered low back pain potentially exacerbated by weight gain  - Evaluated finger mobility issues related to previous tendon surgery  - Reviewed patient's anxiety and depression history  - Supported patient's decision to pursue therapy before considering medication for mental health    LOW BACK PAIN:  - Prescribed Ibuprofen 400mg as needed for low back pain flare-ups.  - Prescribed Lidocaine patches as needed for low back pain.  - Recommend weight loss as a first step to alleviate back pain.  - Advised considering physical therapy for low back pain after initial weight loss efforts.  - Educated the patient on the connection between weight and back pain.    HERPES SIMPLEX VIRUS:  - Educated the patient on the prevalence of herpes simplex virus in the general population.  - Continued Valacyclovir (Valtrex) prescription as needed for herpes outbreaks.    ANXIETY:  - Scheduled follow-up visit in 6 weeks for anxiety and lab results review.  - Discussed the option of medication if therapy alone is not sufficient for managing anxiety.  - Acknowledged the patient's struggle with stress and anxiety due to  recent life changes.  - Noted that the patient is currently seeing a therapist and has a second appointment scheduled.  - Advised the patient to continue current therapy sessions for mental health support.    DEPRESSION:  - Discussed the option of medication if therapy alone is not sufficient for managing depression.  - Acknowledged the patient's struggle with stress and depression due to recent life changes.  - Noted that the patient is currently seeing a therapist and has a second appointment scheduled.    GRIEF:  - Acknowledged the recent loss of sister on July 1st and its impact on mental health.  - Noted that the patient is seeing a therapist to help cope with the loss.    NICOTINE DEPENDENCE:  - Noted the patient's current vaping habit.  - Acknowledged the patient's plan to quit vaping during summer break.    HAND MOBILITY ISSUES:  - Referred the patient to physical therapy for finger mobility issues related to previous tendon surgery.  - Noted decreased mobility and increased pain in the patient's right hand finger due to previous surgery and scar tissue.    FAMILY HISTORY:  - Noted family history of hypertension in father.  - Noted family history of diabetes on mother's paternal side.    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  - Discussed coping strategies for managing stress.  - Noted that the patient is seeing a therapist to help manage stress.    GASTROINTESTINAL ISSUES:  - Noted past episode of severe nausea and vomiting that led to an ER visit.  - Reviewed ER visit results, which showed no significant findings.  - Inquired about recent GI issues.  - Recommend increasing fiber intake to improve bowel movements and overall digestive health.  - Explained importance of fiber in diet for colon health and regular bowel movements.  - Ms. Montes to increase fiber intake through fruits, vegetables, or supplements  like Metamucil.    WEIGHT MANAGEMENT:  - Discussed gradual approach to lifestyle changes for sustainable weight loss.  - Recommend reducing sugary drink consumption, switching to sugar-free alternatives like sparkling water.    - Ms. Montes to limit carbohydrate intake, especially from processed and frozen foods.  - Recommend engaging in purposeful movement and exercise  for 30 minutes, at least 3 days per week.    LABS:  - CBC, CMP, lipid panel, hemoglobin A1C, thyroid function tests, HIV test, and Hepatitis C test ordered.           RTC in 6 weeks or sooner if needed    Tushar Lovett DNP, APRN, FNP-BC [facility]  02/13/2025 1:05 PM    This note was generated with the assistance of ambient listening technology. Verbal consent was obtained by the patient and accompanying visitor(s) for the recording of patient appointment to facilitate this note. I attest to having reviewed and edited the generated note for accuracy, though some syntax or spelling errors may persist. Please contact the author of this note for any clarification.

## 2025-02-14 LAB
C TRACH DNA SPEC QL NAA+PROBE: NOT DETECTED
N GONORRHOEA DNA SPEC QL NAA+PROBE: NOT DETECTED

## 2025-02-20 ENCOUNTER — RESULTS FOLLOW-UP (OUTPATIENT)
Dept: OBSTETRICS AND GYNECOLOGY | Facility: CLINIC | Age: 20
End: 2025-02-20
Payer: COMMERCIAL

## 2025-03-20 ENCOUNTER — OFFICE VISIT (OUTPATIENT)
Facility: CLINIC | Age: 20
End: 2025-03-20
Payer: COMMERCIAL

## 2025-03-20 ENCOUNTER — PATIENT OUTREACH (OUTPATIENT)
Dept: ADMINISTRATIVE | Facility: OTHER | Age: 20
End: 2025-03-20
Payer: COMMERCIAL

## 2025-03-20 VITALS
OXYGEN SATURATION: 100 % | HEIGHT: 67 IN | HEART RATE: 97 BPM | TEMPERATURE: 98 F | DIASTOLIC BLOOD PRESSURE: 80 MMHG | RESPIRATION RATE: 18 BRPM | WEIGHT: 258.63 LBS | SYSTOLIC BLOOD PRESSURE: 122 MMHG | BODY MASS INDEX: 40.59 KG/M2

## 2025-03-20 DIAGNOSIS — B00.9 HERPES SIMPLEX TYPE 2 INFECTION: ICD-10-CM

## 2025-03-20 DIAGNOSIS — F32.A ANXIETY AND DEPRESSION: Primary | ICD-10-CM

## 2025-03-20 DIAGNOSIS — M79.644 FINGER PAIN, RIGHT: ICD-10-CM

## 2025-03-20 DIAGNOSIS — F41.9 ANXIETY AND DEPRESSION: Primary | ICD-10-CM

## 2025-03-20 DIAGNOSIS — Z71.2 ENCOUNTER TO DISCUSS TEST RESULTS: ICD-10-CM

## 2025-03-20 DIAGNOSIS — M54.50 MIDLINE LOW BACK PAIN WITHOUT SCIATICA, UNSPECIFIED CHRONICITY: ICD-10-CM

## 2025-03-20 PROCEDURE — 3044F HG A1C LEVEL LT 7.0%: CPT | Mod: CPTII,S$GLB,,

## 2025-03-20 PROCEDURE — 3008F BODY MASS INDEX DOCD: CPT | Mod: CPTII,S$GLB,,

## 2025-03-20 PROCEDURE — 3074F SYST BP LT 130 MM HG: CPT | Mod: CPTII,S$GLB,,

## 2025-03-20 PROCEDURE — 3079F DIAST BP 80-89 MM HG: CPT | Mod: CPTII,S$GLB,,

## 2025-03-20 PROCEDURE — 99999 PR PBB SHADOW E&M-EST. PATIENT-LVL IV: CPT | Mod: PBBFAC,,,

## 2025-03-20 PROCEDURE — 1160F RVW MEDS BY RX/DR IN RCRD: CPT | Mod: CPTII,S$GLB,,

## 2025-03-20 PROCEDURE — 99214 OFFICE O/P EST MOD 30 MIN: CPT | Mod: S$GLB,,,

## 2025-03-20 PROCEDURE — 1159F MED LIST DOCD IN RCRD: CPT | Mod: CPTII,S$GLB,,

## 2025-03-20 RX ORDER — BUSPIRONE HYDROCHLORIDE 7.5 MG/1
7.5 TABLET ORAL 2 TIMES DAILY
Qty: 60 TABLET | Refills: 0 | Status: SHIPPED | OUTPATIENT
Start: 2025-03-20 | End: 2025-04-19

## 2025-03-20 NOTE — PROGRESS NOTES
CHW - Initial Contact    This Community Health Worker updated the Social Determinant of Health questionnaire with patient during clinic visit today.    Pt identified barriers of most importance are: Patient previously reported having food insecurity    Referrals to community agencies completed with patient/caregiver consent outside of [facility] include: No.  Referrals were put through [facility] - no:  Support and Services: No.  Other information discussed the patient needs / wants help with: SDOH updated. Patient previously report having food insecurity. Patient advised to CHW she is not in the need of food at this time. Patient stated she is not homeless or near homelessness. CHW advised patient to reach out to me directly if anything changes. Patient did not report any other barriers at this time. Patient's case will be closed.    Follow up required: No.

## 2025-04-01 NOTE — PROGRESS NOTES
SUBJECTIVE     Chief Complaint   Patient presents with    Follow-up     Pt states she is coming in for a f/u        HPI  Alissonmelvi Montes is a 20 y.o. female with multiple medical diagnoses as listed in the medical history and problem list that presents for follow-up.    History of Present Illness    CHIEF COMPLAINT:  Ms. Montes presents today for follow up regarding anxiety and depression.    MENTAL HEALTH:  She is currently attending therapy and reports a positive relationship with her therapist despite finding them occasionally judgmental. She experienced increased depression during Mardi Gras. She has previous experience with Lexapro at age 14-15, reporting adverse effects of feeling emotionally flat.    CHRONIC PAIN:  She reports stable chronic back pain and believes weight loss would help alleviate symptoms. She denies interest in physical therapy for back pain. She missed physical therapy appointment for hand/finger issue and has not rescheduled.    HERPES:  She reports experiencing herpes outbreaks triggered by stress, with two recent episodes requiring Valtrex treatment. STD testing was normal.    SOCIAL HISTORY:  She plans to quit vaping in July. Her diet was disrupted during Mardi Gras but she is now returning to her regular dietary regimen.      ROS:  General: -fever, -chills, -fatigue, -weight gain, -weight loss  Eyes: -vision changes, -redness, -discharge  ENT: -ear pain, -nasal congestion, -sore throat  Cardiovascular: -chest pain, -palpitations, -lower extremity edema  Respiratory: -cough, -shortness of breath  Gastrointestinal: -abdominal pain, -nausea, -vomiting, -diarrhea, -constipation, -blood in stool  Genitourinary: -dysuria, -hematuria, -frequency  Musculoskeletal: -joint pain, -muscle pain, +back pain, +limb pain  Skin: -rash, -lesion, +sores  Neurological: -headache, -dizziness, -numbness, -tingling  Psychiatric: +anxiety, +depression, -sleep difficulty         PAST MEDICAL HISTORY:  Past  "Medical History:   Diagnosis Date    Herpes simplex virus (HSV) infection     Yeast infection        PAST SURGICAL HISTORY:  Past Surgical History:   Procedure Laterality Date    right finger Right 2018    procedure       SOCIAL HISTORY:  Social History[1]    FAMILY HISTORY:  Family History   Problem Relation Name Age of Onset    Cervical cancer Mother      Hypertension Father      Pneumonia Sister      No Known Problems Maternal Grandmother      Diabetes Maternal Grandfather      Hypertension Paternal Grandmother      Hypertension Paternal Grandfather      Breast cancer Maternal Aunt      Colon cancer Neg Hx      Ovarian cancer Neg Hx         ALLERGIES AND MEDICATIONS: updated and reviewed.  Review of patient's allergies indicates:   Allergen Reactions    Tioconazole Other (See Comments)    Miconazole-skin clnsr17 Itching     Itching and burning    Itching and burning   Itching and burning     Current Medications[2]    OBJECTIVE     Physical Exam  Vitals:    03/20/25 1539   BP: 122/80   Pulse: 97   Resp: 18   Temp: 98 °F (36.7 °C)    Body mass index is 40.5 kg/m².  Weight: 117.3 kg (258 lb 9.6 oz)   Height: 5' 7" (170.2 cm)     Physical Exam  Constitutional:       Appearance: Normal appearance. She is obese.   HENT:      Right Ear: Tympanic membrane normal. There is no impacted cerumen.      Left Ear: Tympanic membrane normal. There is no impacted cerumen.      Nose: Nose normal. No congestion or rhinorrhea.      Mouth/Throat:      Mouth: Mucous membranes are moist.      Pharynx: Oropharynx is clear.   Eyes:      General:         Right eye: No discharge.         Left eye: No discharge.      Conjunctiva/sclera: Conjunctivae normal.   Cardiovascular:      Rate and Rhythm: Normal rate and regular rhythm.      Pulses: Normal pulses.      Heart sounds: Normal heart sounds.   Pulmonary:      Effort: Pulmonary effort is normal. No respiratory distress.      Breath sounds: Normal breath sounds. No wheezing.   Chest:      " Chest wall: No tenderness.   Abdominal:      General: Bowel sounds are normal. There is no distension.      Palpations: Abdomen is soft.      Tenderness: There is no abdominal tenderness.   Musculoskeletal:         General: No swelling, tenderness or signs of injury. Normal range of motion.      Cervical back: Normal range of motion. No rigidity or tenderness.      Right lower leg: No edema.      Left lower leg: No edema.   Skin:     General: Skin is warm and dry.      Coloration: Skin is not pale.      Findings: No erythema, lesion or rash.   Neurological:      Mental Status: She is alert and oriented to person, place, and time.   Psychiatric:         Mood and Affect: Mood normal.         Behavior: Behavior normal.       Health Maintenance         Date Due Completion Date    HPV Vaccines (1 - 3-dose series) Never done ---    TETANUS VACCINE 02/11/2026 (Originally 2/22/2021) ---    Pneumococcal Vaccines (Age 0-49) (1 of 2 - PCV) 02/11/2026 (Originally 2/22/2022) ---    Chlamydia Screening 02/10/2026 2/10/2025    Pap Smear 02/10/2028 2/10/2025    RSV Vaccine (Age 60+ and Pregnant patients) (1 - 1-dose 75+ series) 02/22/2078 ---              ASSESSMENT     20 y.o. female with     1. Anxiety and depression    2. Herpes simplex type 2 infection    3. Midline low back pain without sciatica, unspecified chronicity    4. Finger pain, right    5. Encounter to discuss test results        PLAN:     1. Anxiety and depression  Improving; Treating   - busPIRone (BUSPAR) 7.5 MG tablet; Take 1 tablet (7.5 mg total) by mouth 2 (two) times daily.  Dispense: 60 tablet; Refill: 0    2. Herpes simplex type 2 infection  Improved; treating     3. Midline low back pain without sciatica, unspecified chronicity  Improved; treating    4. Finger pain, right  Improved; Assessed    5. Encounter to discuss test results  New; discussed; Educated    Assessment & Plan    F32.9 Major depressive disorder, single episode, unspecified  F41.9 Anxiety  "disorder, unspecified  M54.50 Low back pain, unspecified  A60.04 Herpesviral vulvovaginitis  F17.290 Nicotine dependence, other tobacco product, uncomplicated    IMPRESSION:  - Considered starting SSRI for anxiety and depression management, as recommended by therapist. Opted for Lexapro (escitalopram) over fluoxetine due to previous experience and current needs. Started with a low dose and will gradually increase to minimize potential side effects. Discussed potential benefits of SSRI in improving mood and enhancing receptiveness to therapy. Noted concern about previous "zombie-like" feeling on Lexapro, will monitor for similar effects. Considered weight neutrality of medication in treatment decision.    DEPRESSION AND ANXIETY:  - Explained mechanism of action for SSRIs, focusing on serotonin regulation and its effects on mood.  - Discussed potential side effects of SSRIs, including the importance of gradual discontinuation if needed.  - Instructed the patient to monitor mood changes and any side effects from new medication.  - Prescribed Lexapro (escitalopram): Take 1/2 tablet daily for the first 7 days, then increase to 1 whole tablet daily after the first week, continuing for 20-30 days.  - Noted that the patient experienced a period of depression during Mardi Gras, which was more severe than usual.  - Confirmed that the patient has been continuing therapy for depression and anxiety.  - Assessed that the patient currently feels fine and not depressed, but acknowledges ongoing anxiety.  - Discussed the potential benefits of starting medication for depression, including improved mood and increased receptiveness to therapy.  - Recommend continuing therapy alongside medication.  - Explained synergistic effect of medication and therapy in managing anxiety and depression.  - Instructed the patient to continue attending therapy sessions.  - Noted that the patient reports ongoing anxiety, which she considers normal.  - " Assessed that the patient currently feels good, with anxiety present but not severe.  - Discussed the potential benefits of starting medication for anxiety, including improved mood and increased receptiveness to therapy.    LOW BACK PAIN:  - Noted that the patient reports back pain is stable and not worsening.  - Assessed that the patient believes weight loss would help with back pain more than physical therapy.    HERPES:  - Continued Valtrex as needed for herpes outbreaks.  - Noted that the patient reports using Valtrex twice during stress-induced breakouts.  - Confirmed that the patient has sufficient Valtrex supply for potential future outbreaks.    NICOTINE DEPENDENCE:  - Assessed that the patient has a plan to quit vaping in July.    GENERAL HEALTH:  - Ms. Montes to maintain current efforts in diet and exercise.    FOLLOW-UP:  - Scheduled a follow-up virtual appointment in 4 weeks.  - Instructed the patient to use the patient portal for any non-urgent questions or concerns, including issues with scheduling physical therapy appointments.     I spent a total of 40 minutes on the day of the visit.This includes face to face time and non-face to face time preparing to see the patient (eg, review of tests), obtaining and/or reviewing separately obtained history, documenting clinical information in the electronic or other health record, independently interpreting results and communicating results to the patient/family/caregiver, or care coordinator.     Tushar Lovett DNP, APRN, FNP-BC  [facility]  03/31/2025 11:27 PM    This note was generated with the assistance of ambient listening technology. Verbal consent was obtained by the patient and accompanying visitor(s) for the recording of patient appointment to facilitate this note. I attest to having reviewed and edited the generated note for accuracy, though some syntax or spelling errors may persist. Please contact the author of this note for any  clarification.            [1]   Social History  Socioeconomic History    Marital status: Single   Occupational History    Occupation:  [employer] 1st grade   Tobacco Use    Smoking status: Every Day     Types: Vaping with nicotine    Smokeless tobacco: Never   Substance and Sexual Activity    Alcohol use: Yes     Alcohol/week: 4.0 standard drinks of alcohol     Types: 1 Cans of beer, 3 Shots of liquor per week     Comment: socially    Drug use: Yes     Types: Marijuana     Comment: occassionally    Sexual activity: Yes     Partners: Male     Birth control/protection: None   Social History Narrative    Single    Lives with 2 roomates    Undergrad in Psychology- plans to go back for different degree.        She is a  in [employer]     Social Drivers of Health     Financial Resource Strain: Medium Risk (2/9/2025)    Overall Financial Resource Strain (CARDIA)     Difficulty of Paying Living Expenses: Somewhat hard   Food Insecurity: No Food Insecurity (3/20/2025)    Hunger Vital Sign     Worried About Running Out of Food in the Last Year: Never true     Ran Out of Food in the Last Year: Never true   Recent Concern: Food Insecurity - Food Insecurity Present (2/9/2025)    Hunger Vital Sign     Worried About Running Out of Food in the Last Year: Sometimes true     Ran Out of Food in the Last Year: Sometimes true   Physical Activity: Insufficiently Active (2/9/2025)    Exercise Vital Sign     Days of Exercise per Week: 4 days     Minutes of Exercise per Session: 20 min   Stress: Stress Concern Present (2/9/2025)    [employer] - Occupational Stress Questionnaire     Feeling of Stress : Very much   Housing Stability: Low Risk  (3/20/2025)    Housing Stability Vital Sign     Unable to Pay for Housing in the Last Year: No     Number of Times Moved in the Last Year: 0     Homeless in the Last Year: No   [2]   Current Outpatient Medications   Medication Sig  Dispense Refill    busPIRone (BUSPAR) 7.5 MG tablet Take 1 tablet (7.5 mg total) by mouth 2 (two) times daily. 60 tablet 0    valACYclovir (VALTREX) 500 MG tablet Take 2 tablets (1,000 mg total) by mouth once daily. 60 tablet 0     No current facility-administered medications for this visit.

## 2025-04-14 ENCOUNTER — PATIENT MESSAGE (OUTPATIENT)
Facility: CLINIC | Age: 20
End: 2025-04-14
Payer: COMMERCIAL

## 2025-04-21 ENCOUNTER — OFFICE VISIT (OUTPATIENT)
Facility: CLINIC | Age: 20
End: 2025-04-21
Payer: COMMERCIAL

## 2025-04-21 DIAGNOSIS — F32.A ANXIETY AND DEPRESSION: ICD-10-CM

## 2025-04-21 DIAGNOSIS — F41.9 ANXIETY AND DEPRESSION: ICD-10-CM

## 2025-04-21 DIAGNOSIS — J06.9 UPPER RESPIRATORY TRACT INFECTION, UNSPECIFIED TYPE: Primary | ICD-10-CM

## 2025-04-21 DIAGNOSIS — Z71.3 DIETARY COUNSELING AND SURVEILLANCE: ICD-10-CM

## 2025-04-21 PROCEDURE — 3044F HG A1C LEVEL LT 7.0%: CPT | Mod: CPTII,95,,

## 2025-04-21 PROCEDURE — 1159F MED LIST DOCD IN RCRD: CPT | Mod: CPTII,95,,

## 2025-04-21 PROCEDURE — 1160F RVW MEDS BY RX/DR IN RCRD: CPT | Mod: CPTII,95,,

## 2025-04-21 PROCEDURE — 98006 SYNCH AUDIO-VIDEO EST MOD 30: CPT | Mod: 95,,,

## 2025-04-26 NOTE — PROGRESS NOTES
The patient's location is:  Patient's Home   The chief complaint leading to consultation is:  Upper respiratory tract infection, unspecified type [J06.9]    Visit type: audiovisual    Time spent in discussion with the patient: 17  25 minutes of total time spent on the encounter. This includes time spent in discussion with the patient and time preparing for the patient appointment: review of tests, obtaining and/or reviewing separately obtained history, documenting clinical information in the electronic or other health record, independently interpreting results (not separately reported), and communicating results to the patient/family/caregiver or care coordination (not separately reported).     Each patient to whom he or she provides medical services by telemedicine is: (1) informed of the relationship between the physician and patient and the respective role of any other health care provider with respect to management of the patient; and (2) notified that he or she may decline to receive medical services by telemedicine and may withdraw from such care at any time.      Subjective:   Zoie Montes is a 20 y.o. female who presents for Upper respiratory tract infection, unspecified type [J06.9].       HPI   History of Present Illness    CHIEF COMPLAINT:  Ms. Montes presents today for follow up    MENTAL HEALTH:  She is currently attending therapy sessions and reports improved rapport with therapist. She denies current use of anxiety medications.    EATING DISORDER:  She reports engaging in binge eating behaviors despite attempts at various weight management strategies.    RECENT ILLNESS:  She had an upper respiratory infection at the beginning of the month which has since resolved with improvement in symptoms.      ROS:  General: -fever, -chills, -fatigue, -weight gain, -weight loss  Eyes: -vision changes, -redness, -discharge  ENT: -ear pain, -nasal congestion, -sore throat  Cardiovascular: -chest pain,  -palpitations, -lower extremity edema  Respiratory: -cough, -shortness of breath  Gastrointestinal: -abdominal pain, -nausea, -vomiting, -diarrhea, -constipation, -blood in stool  Genitourinary: -dysuria, -hematuria, -frequency  Musculoskeletal: -joint pain, -muscle pain  Skin: -rash, -lesion  Neurological: -headache, -dizziness, -numbness, -tingling  Psychiatric: +anxiety, -depression, -sleep difficulty, +food binging       Current Outpatient Medications   Medication Instructions    busPIRone (BUSPAR) 7.5 mg, Oral, 2 times daily    valACYclovir (VALTREX) 1,000 mg, Oral, Daily       Objective:   No home vitals reported.     Physical Exam      Assessment/Plan:        Diagnoses and all orders for this visit:    Upper respiratory tract infection, unspecified type  Improved; treating     Anxiety and depression  Improved; Addressing    Dietary counseling and surveillance  New; Discussed     Assessment & Plan    F41.9 Anxiety disorder, unspecified  Z87.898 Personal history of other specified conditions    IMPRESSION:  - Reviewed recent upper respiratory infection, which pt states has resolved.  - Assessed anxiety management, noting patient is attending therapy sessions as scheduled with improvement.  - Evaluated appropriateness of weight loss interventions, including GLP-1 agonists, based on labs, overall health status, and lack of comorbidities.    BINGE EATING:  - Acknowledged the patient's reported binge eating episodes and discussed potential treatment options.  - Emphasized the importance of lifestyle changes in conjunction with medication.  - Explained the mechanism of action for inquired GLP-1 agonists therapies including effects on gastric motility and satiety.  - Discussed potential side effects of weight loss medications.  - Instructed the patient to research GLP-1 agonists and discuss with insurance provider regarding potential coverage.  - Considered referral to weight management specialty clinic for  comprehensive evaluation of weight loss medication options, including appetite suppressants not available at current clinic.  - Provided information on insurance coverage patterns for weight loss medications, noting coverage often depends on presence of comorbidities.  - Emphasized importance of proper hydration, appropriate eating habits, and exercise in mitigating side effects of weight loss medications.    ANXIETY DISORDER:  - Inquired about the patient's anxiety status.  - Noted that the patient reports feeling okay and attending therapy.  - Discussed the importance of keeping the therapist informed about medication status.  - Advised that medication for anxiety is an option if needed, although the patient is currently not taking any.    OTHER MEDICAL CONDITIONS:  - Confirmed that the patient's recent upper respiratory infection has resolved.    FOLLOW-UP:  - Scheduled a follow-up visit in 3 months or sooner if needed.        Tushar Lovett DNP, FNP-BC

## 2025-06-05 DIAGNOSIS — F32.A ANXIETY AND DEPRESSION: ICD-10-CM

## 2025-06-05 DIAGNOSIS — F41.9 ANXIETY AND DEPRESSION: ICD-10-CM

## 2025-06-05 RX ORDER — BUSPIRONE HYDROCHLORIDE 7.5 MG/1
7.5 TABLET ORAL 2 TIMES DAILY
Qty: 60 TABLET | Refills: 0 | Status: SHIPPED | OUTPATIENT
Start: 2025-06-05 | End: 2025-07-05

## 2025-07-14 ENCOUNTER — PATIENT MESSAGE (OUTPATIENT)
Facility: CLINIC | Age: 20
End: 2025-07-14
Payer: COMMERCIAL

## 2025-07-22 ENCOUNTER — OFFICE VISIT (OUTPATIENT)
Facility: CLINIC | Age: 20
End: 2025-07-22
Payer: COMMERCIAL

## 2025-07-22 DIAGNOSIS — F32.A ANXIETY AND DEPRESSION: ICD-10-CM
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
## 2025-07-22 DIAGNOSIS — J06.9 UPPER RESPIRATORY TRACT INFECTION, UNSPECIFIED TYPE: ICD-10-CM

## 2025-07-22 DIAGNOSIS — F41.9 ANXIETY AND DEPRESSION: ICD-10-CM

## 2025-07-22 PROBLEM — B00.9 HSV-2 INFECTION: Chronic | Status: ACTIVE | Noted: 2025-07-22

## 2025-07-22 PROBLEM — E66.9 OBESITY: Chronic | Status: ACTIVE | Noted: 2025-07-22

## 2025-07-22 RX ORDER — BUSPIRONE HYDROCHLORIDE 7.5 MG/1
7.5 TABLET ORAL 2 TIMES DAILY
Qty: 60 TABLET | Refills: 2 | Status: SHIPPED | OUTPATIENT
Start: 2025-07-22 | End: 2025-10-20

## 2025-07-22 RX ORDER — ACETAMINOPHEN 500 MG
500-1000 TABLET ORAL EVERY 6 HOURS PRN
COMMUNITY
Start: 2025-07-17 | End: 2025-07-24

## 2025-07-24 NOTE — PROGRESS NOTES
The patient location is: [place]    Visit type: audiovisual    Face to Face time with patient: 14  27 minutes of total time spent on the encounter, which includes face to face time and non-face to face time preparing to see the patient (eg, review of tests), Obtaining and/or reviewing separately obtained history, Documenting clinical information in the electronic or other health record, Independently interpreting results (not separately reported) and communicating results to the patient/family/caregiver, or Care coordination (not separately reported).         Each patient to whom he or she provides medical services by telemedicine is:  (1) informed of the relationship between the physician and patient and the respective role of any other health care provider with respect to management of the patient; and (2) notified that he or she may decline to receive medical services by telemedicine and may withdraw from such care at any time.    The patient's location is:  Patient's Home   The chief complaint leading to consultation is:  Insect bite, unspecified site, subsequent encounter [W57.XXXD]    Subjective:   Zoie Montes is a 20 y.o. female who presents for Insect bite, unspecified site, subsequent encounter [W57.XXXD].       HPI   History of Present Illness    CHIEF COMPLAINT:  Ms. Monets presents today for follow up and recent urgent care visit with treatments of insect bites.    INSECT BITES AND SECONDARY INFECTION:  She initially developed inflamed, itchy, and swollen bumps on hand, followed by additional lesions on feet and arms. [facility] diagnosed probable bedbugs or fleas. She developed a large, warm-to-touch infection on arm, which is improving with antibiotics and topical antibiotic ointment. She reports no new bites, and denies spread to roommates or other household members.    ANXIETY AND DEPRESSION:  She reports improvement in anxiety symptoms with buspirone, noting significant reduction.  Depression remains unchanged and unaffected by buspirone. She is actively engaged in therapy with good progress, and prefers to continue therapeutic management rather than adding additional medications for depression.    SKIN CONCERNS:  She reports facial skin condition with dry, mildly painful red spots. She has a history of random acne flare-ups, and acknowledges the current presentation appears more consistent with inflammatory acne than classic eczema. She is currently using moisturizer and interested in OTC eczema cream for management.    RECENT ILLNESS:  She reports resolution of previously diagnosed upper respiratory infection with significant improvement in symptoms compared to prior visit.      ROS:  General: -fever, -chills, -fatigue, -weight gain, -weight loss  Eyes: -vision changes, -redness, -discharge  ENT: -ear pain, -nasal congestion, -sore throat  Cardiovascular: -chest pain, -palpitations, -lower extremity edema  Respiratory: -cough, -shortness of breath  Gastrointestinal: -abdominal pain, -nausea, -vomiting, -diarrhea, -constipation, -blood in stool  Genitourinary: -dysuria, -hematuria, -frequency  Musculoskeletal: -joint pain, -muscle pain  Skin: -rash, +lesion, +insect bites, +skin redness, +skin cracking, +dry skin  Neurological: -headache, -dizziness, -numbness, -tingling  Psychiatric: +anxiety, +depression, -sleep difficulty           Current Outpatient Medications   Medication Instructions    acetaminophen (TYLENOL) 500-1,000 mg, Every 6 hours PRN    busPIRone (BUSPAR) 7.5 mg, Oral, 2 times daily    valACYclovir (VALTREX) 1,000 mg, Oral, Daily       Objective:   No home vitals reported.     Physical Exam      Assessment/Plan:        Diagnoses and all orders for this visit:    Insect bite, unspecified site, subsequent encounter  q  Anxiety and depression  -     busPIRone (BUSPAR) 7.5 MG tablet; Take 1 tablet (7.5 mg total) by mouth 2 (two) times daily.    Upper respiratory tract infection,  unspecified type          Assessment & Plan    S90.869D Insect bite (nonvenomous), unspecified foot, subsequent encounter  L08.9 Local infection of the skin and subcutaneous tissue, unspecified  L70.8 Other acne  F41.9 Anxiety disorder, unspecified  F32.9 Major depressive disorder, single episode, unspecified    INSECT BITE AND SKIN INFECTION:  - Evaluated in discussion the patient's inflamed, itchy, and swollen bumps on hand, initially thought to be mosquito bites but later suspected to be bedbugs or fleas.  - Patient notes significant improvement of the skin infection with antibiotic therapy, as the affected areas were previously warm to touch.  - She reports that the insect bites have mostly resolved with treatment.  - Emphasized the importance of completing the full course of antibiotics.  - Instructed the patient to finish the remaining 2 doses of Keflex.    ACNE AND ECZEMA:  - Evaluated the patient's facial skin condition as best as can virtually, noting red spots that appear more inflamed than typical eczema.  - Ms. Montes reports dry and painful eczema on face despite regular moisturizing.  - Assessed the condition as likely a combination of eczema and acne.  - Recommend OTC treatments before considering dermatology referral.  -  CeraVe Foaming Cream Cleanser with 4% benzoyl peroxide to decrease oil and prevent acne flare-ups, to be used morning and evening.  - hydrocortisone cream, to apply thin layer to affected facial areas once or twice daily for 2 weeks only.  - Discussed potential for skin thinning with prolonged use of hydrocortisone cream and drying effects of benzoyl peroxide face wash requiring additional moisturizing.  - Instructed the patient to clean face thoroughly before applying hydrocortisone cream, avoid applying makeup for the next couple of weeks, and use basic, unscented moisturizer after hydrocortisone cream application.    ANXIETY DISORDER:  - Evaluated current anxiety management,  noting improvement in anxiety symptoms with buspirone.  - Continued buspirone therapy and provided refill.  - continue virtual therapy sessions     MAJOR DEPRESSIVE DISORDER:  - Evaluated current depression management, noting no change in depression symptoms.  - Discussed potential therapy options with Dr. Zarate, but the patient prefers current therapist.  - patient also not interested at this time in additional Rx therapy   - Instructed patient to follow up should symptoms worsen or any concerns     FOLLOW-UP:  - Follow up with virtual appointment in November.  - Contact office if any problems or complaints arise before scheduled follow-up.         Tushar Lovett DNP, FNP-BC

## 2025-08-27 DIAGNOSIS — F32.A ANXIETY AND DEPRESSION: ICD-10-CM

## 2025-08-27 DIAGNOSIS — F41.9 ANXIETY AND DEPRESSION: ICD-10-CM

## 2025-08-28 RX ORDER — BUSPIRONE HYDROCHLORIDE 7.5 MG/1
7.5 TABLET ORAL 2 TIMES DAILY
Qty: 60 TABLET | Refills: 2 | Status: SHIPPED | OUTPATIENT
Start: 2025-08-28 | End: 2025-11-26